# Patient Record
Sex: FEMALE | Race: WHITE | Employment: OTHER | URBAN - METROPOLITAN AREA
[De-identification: names, ages, dates, MRNs, and addresses within clinical notes are randomized per-mention and may not be internally consistent; named-entity substitution may affect disease eponyms.]

---

## 2018-12-24 ENCOUNTER — NURSING HOME VISIT (OUTPATIENT)
Dept: GERIATRICS | Facility: CLINIC | Age: 83
End: 2018-12-24
Payer: COMMERCIAL

## 2018-12-24 VITALS
SYSTOLIC BLOOD PRESSURE: 119 MMHG | HEART RATE: 65 BPM | OXYGEN SATURATION: 93 % | RESPIRATION RATE: 18 BRPM | BODY MASS INDEX: 24.11 KG/M2 | HEIGHT: 59 IN | WEIGHT: 119.6 LBS | TEMPERATURE: 97.6 F | DIASTOLIC BLOOD PRESSURE: 73 MMHG

## 2018-12-24 DIAGNOSIS — K58.9 IRRITABLE BOWEL SYNDROME, UNSPECIFIED TYPE: ICD-10-CM

## 2018-12-24 DIAGNOSIS — Z86.73 HISTORY OF CVA (CEREBROVASCULAR ACCIDENT): ICD-10-CM

## 2018-12-24 DIAGNOSIS — M25.512 CHRONIC LEFT SHOULDER PAIN: ICD-10-CM

## 2018-12-24 DIAGNOSIS — G20.A1 PARKINSON'S SYNDROME (H): ICD-10-CM

## 2018-12-24 DIAGNOSIS — F41.8 INSOMNIA SECONDARY TO DEPRESSION WITH ANXIETY: ICD-10-CM

## 2018-12-24 DIAGNOSIS — G89.29 CHRONIC LEFT SHOULDER PAIN: ICD-10-CM

## 2018-12-24 DIAGNOSIS — M15.0 PRIMARY OSTEOARTHRITIS INVOLVING MULTIPLE JOINTS: ICD-10-CM

## 2018-12-24 DIAGNOSIS — K59.01 SLOW TRANSIT CONSTIPATION: ICD-10-CM

## 2018-12-24 DIAGNOSIS — F51.05 INSOMNIA SECONDARY TO DEPRESSION WITH ANXIETY: ICD-10-CM

## 2018-12-24 DIAGNOSIS — F41.9 ANXIETY: ICD-10-CM

## 2018-12-24 DIAGNOSIS — H34.9 RETINAL VASCULAR OCCLUSION OF LEFT EYE: ICD-10-CM

## 2018-12-24 DIAGNOSIS — G31.84 MILD COGNITIVE IMPAIRMENT WITH MEMORY LOSS: ICD-10-CM

## 2018-12-24 PROBLEM — G25.81 RESTLESS LEGS SYNDROME: Status: ACTIVE | Noted: 2018-12-24

## 2018-12-24 PROBLEM — G47.00 INSOMNIA: Status: ACTIVE | Noted: 2018-12-24

## 2018-12-24 PROCEDURE — 99310 SBSQ NF CARE HIGH MDM 45: CPT | Performed by: NURSE PRACTITIONER

## 2018-12-24 RX ORDER — MIRTAZAPINE 15 MG/1
15 TABLET, FILM COATED ORAL AT BEDTIME
COMMUNITY
End: 2020-01-01

## 2018-12-24 RX ORDER — POLYETHYLENE GLYCOL 3350 17 G/17G
17 POWDER, FOR SOLUTION ORAL EVERY OTHER DAY
Start: 2018-12-24

## 2018-12-24 RX ORDER — MENTHOL AND METHYL SALICYLATE 10; 30 G/100G; G/100G
CREAM TOPICAL 2 TIMES DAILY
COMMUNITY
End: 2019-02-07

## 2018-12-24 RX ORDER — AMOXICILLIN 500 MG/1
2000 TABLET, FILM COATED ORAL PRN
COMMUNITY
End: 2019-05-23

## 2018-12-24 RX ORDER — ACETAMINOPHEN 325 MG/1
650 TABLET ORAL 3 TIMES DAILY
COMMUNITY
End: 2019-02-07

## 2018-12-24 RX ORDER — POLYETHYLENE GLYCOL 3350 17 G/17G
17 POWDER, FOR SOLUTION ORAL DAILY
COMMUNITY
End: 2018-12-24

## 2018-12-24 RX ORDER — CLONAZEPAM 0.25 MG/1
0.25 TABLET, ORALLY DISINTEGRATING ORAL AT BEDTIME
COMMUNITY
End: 2019-04-29

## 2018-12-24 RX ORDER — ERYTHROMYCIN 5 MG/G
0.25 OINTMENT OPHTHALMIC AT BEDTIME
COMMUNITY

## 2018-12-24 RX ORDER — CLOPIDOGREL BISULFATE 75 MG/1
75 TABLET ORAL DAILY
COMMUNITY

## 2018-12-24 ASSESSMENT — MIFFLIN-ST. JEOR: SCORE: 868.13

## 2018-12-24 NOTE — LETTER
"    12/24/2018        RE: Marilu Starr  [address] [facility] [address]        [place] [facility]    Chief Complaint   Patient presents with     Clinic Care Coordination - Initial       [facility] Medical Record Number:  [id number]  Place of Service where encounter took place:  [facility] ([facility]) [[id number]]    HPI:    Marilu Starr is a 89 year old  (8/9/1928), who is being seen today for an episodic care visit to establish care.  HPI information obtained from: facility chart records, facility staff, patient report, [facility] Epic chart review and family/first contact pt's sons' report report.Today's concern is:     Primary osteoarthritis involving multiple joints  Chronic left shoulder pain  Mild cognitive impairment with memory loss  Anxiety  Insomnia secondary to depression with anxiety  Irritable bowel syndrome, unspecified type  Slow transit constipation  History of CVA (cerebrovascular accident)  Parkinson's syndrome (H)  Retinal vascular occlusion of left eye     Patient is a 89 year old woman with PMH of anxiety, mild cognitive impairment, depression, insomnia, chronic OA pain especially  In her left shoulder, bilat knees, s/p left knee replacement, IBS now with constipation, Parkinson's features, and retinal vascular occlusion of left eye. She has been residing at [facility] and has not elected to have the [facility] team follow her there.      Today she is met in her LTC room where her two sons (Marcus and Darwin).  She reports left shoulder pain that is chronic but painful and some mild lightheadedness.  She denies SOB, CP, HA, heartburn, upset stomach, constipation.  Per [facility] EHR it appears she is having 2-3 BMs per day on average.   Her sons report that she has some cognitive impairment and have questions regarding this.  They also report that she has chronic left shoulder pain, had a \"rotator cuff repair in the past\" and have c/o bilat knee pain in the past (even after her left " "knee TKO which her son attributes to some possible \"phantom pain.\").      ALLERGIES: Codeine sulfate [codeine]; Demerol [meperidine]; and Morphine sulfate [morphine]  Past Medical, Surgical, Family and Social History reviewed and updated in [facility].    Current Outpatient Medications   Medication Sig Dispense Refill     acetaminophen (TYLENOL) 325 MG tablet Take 650 mg by mouth 3 times daily Also BID PRN       amoxicillin (AMOXIL) 500 MG tablet Take 2,000 mg by mouth as needed (prior to dental apt)       clonazePAM (KLONOPIN) 0.25 MG TBDP ODT tab Take 0.25 mg by mouth At Bedtime       clopidogrel (PLAVIX) 75 MG tablet Take 75 mg by mouth daily       erythromycin (ROMYCIN) 5 MG/GM ophthalmic ointment Place 0.25 inches into both eyes At Bedtime       Menthol-Methyl Salicylate (ICY HOT EXTRA STRENGTH) 10-30 % CREA Externally apply topically 2 times daily       mirtazapine (REMERON) 15 MG tablet Take 15 mg by mouth At Bedtime       polyethylene glycol (MIRALAX/GLYCOLAX) packet Take 17 g by mouth every other day       Medications reviewed:  Medications reconciled to facility chart and changes were made to reflect current medications as identified as above med list. Below are the changes that were made:   Medications stopped since last EPIC medication reconciliation:   There are no discontinued medications.    Medications started since last [facility] medication reconciliation:  Orders Placed This Encounter   Medications     acetaminophen (TYLENOL) 325 MG tablet     Sig: Take 650 mg by mouth 3 times daily Also BID PRN     amoxicillin (AMOXIL) 500 MG tablet     Sig: Take 2,000 mg by mouth as needed (prior to dental apt)     clonazePAM (KLONOPIN) 0.25 MG TBDP ODT tab     Sig: Take 0.25 mg by mouth At Bedtime     clopidogrel (PLAVIX) 75 MG tablet     Sig: Take 75 mg by mouth daily     erythromycin (ROMYCIN) 5 MG/GM ophthalmic ointment     Sig: Place 0.25 inches into both eyes At Bedtime     Menthol-Methyl Salicylate (ICY HOT EXTRA " "STRENGTH) 10-30 % CREA     Sig: Externally apply topically 2 times daily     mirtazapine (REMERON) 15 MG tablet     Sig: Take 15 mg by mouth At Bedtime     polyethylene glycol (MIRALAX/GLYCOLAX) packet     Sig: Take 17 g by mouth daily     REVIEW OF SYSTEMS:  Limited secondary to cognitive impairment but today pt reports 10 point ROS of systems including Constitutional, Eyes, Respiratory, Cardiovascular, Gastroenterology, Genitourinary, Integumentary, Musculoskeletal, Psychiatric were all negative except for pertinent positives noted in my HPI.    Physical Exam:  /73   Pulse 65   Temp 97.6  F (36.4  C)   Resp 18   Ht 1.499 m (4' 11\")   Wt 54.3 kg (119 lb 9.6 oz)   SpO2 93%   BMI 24.16 kg/m     GENERAL APPEARANCE:  Alert, in no distress, very frail, kyphotic posture, thin, pleasant  HEENT: [place] with bilat hearing aids in place  RESP:  respiratory effort and palpation of chest normal, auscultation of lungs clear but diminished , no respiratory distress  CV:  Palpation and auscultation of heart done , rate and rhythm regular, no murmur, no LE peripheral edema  ABDOMEN:  normal bowel sounds, soft, nontender, no hepatosplenomegaly or other masses  M/S:   Gait and station with tilting W/C for mobility, Digits and nails with arthritic changes, significantly reduced muscle mass, left knee old TKO scar. Left shoulder old scar  SKIN:  Inspection and Palpation of skin and subcutaneous tissue fragile, pale, poor turgor, intact (see above regarding scars)  PSYCH:  insight and judgement, memory with mild impairmwent, affect and mood normal, follows commands readily         Recent Labs:   Reviewed in SNF EHR      Assessment/Plan:  Primary osteoarthritis involving multiple joints  Chronic left shoulder pain  Per patient and family, bilat knee pain, left shoulder pain  S/p left TKO with persistent pain still after.     Patient today denies pain to her knees, reports left shoulder pain. Has scar which supports previous " "rotator cuff surgery; family reports \"repair\" not replacement.     Analgesia currently with Tylenol 650 mg TID and 650 mg BID PRN, BioFreeze topically. Patient reports today she finds relief from the Tylenol 650 mg.      Discission today about xray for left shoulder and if degenerative changes noted, can do steroid injection.   Risks vs benefits discussed with patient and her two sons who agreed to injection if xray confirmatory for OA changes.     PLAN:  - xray for left shoulder pain  - steroid injection soon if xray confirming degenerative changes  - continue Tylenol 650 mg TID and BID PRN  - continue BioFreeze.   - Physical Therapy, Occupational Therapy for therapy   - monitor for titration needs     Mild cognitive impairment with memory loss  BIMS 13 per SNF EHR  Patient conversant today, repeating her pain in left shoulder.   Discussion with family about dementia and progressive decline; large family history of dementia.   Can order cogn testing since ordering Physical Therapy, Occupational Therapy but unlikely would change POC for care/management.     PLAN:  - nursing for supportive cares  - Occupational Therapy for cog testing.     Anxiety  Insomnia secondary to depression with anxiety  On mirtazapine 15 mg at bedtime, clonazepam 0.25 mg at bedtime  PHQ9 - 0, per SNF EHR  Patient reports trouble falling asleep but is able to stay asleep.  She endorses a good appetite.   Weights stable around 119 lbs over last year.     PLAN:  - continue clonazepam and mirtazapine, monitor for ability to GDR.       Irritable bowel syndrome, unspecified type  Slow transit constipation  Family reports history of IBS with fluctuating constipation nd diarrhea.   Patient on Miralax 17 gm po q HS currently  Per EHR, patient having 2-3 BMs/day ob average.     PLAN:  - change Miralax to every other day  - monitor for titration needs     History of CVA (3/2005)  On clopidogrel 75 mg daily  Equal strengths with UE and LEs.   Seemingly " "no residual deficits   Continue copidogrel.      Parkinson's syndrome (H)  Family reports dx of PD but then Neurologist noted this is likely mis-diagnoses but she does have some Parkinsonism features. Taken off Sinemet by Neurologist    Family reports cognitive decline, \"freezing\" and stiffness.  Mild flat affect today, no drooling, no tremor noted.     Monitor     Retinal vascular occlusion of left eye  F/y Ophthalmologist Dr Rasehed  On erythromycin oint at bedtime  continue      Orders:  1. Physical Therapy, Occupational Therapy consult to eval & treat Dx: weakness, reduced ROM  2. Change Miralax to 17 gm po every other day  3. Left shoulder xray 2 view Dx: left shoulder pain.     Total time with patient visit: >40 minutes including discussions about the POC and care coordination with the patient, family, patient's sons and nursing. Greater than 50% of total time spent with counseling and coordinating care due to patient visit, review of records, discussion with patient's family regarding POC.      Electronically signed by  LYNDSAY Cotton CNP                      Sincerely,        LYNDSAY Cotton CNP    "

## 2018-12-27 ENCOUNTER — NURSING HOME VISIT (OUTPATIENT)
Dept: GERIATRICS | Facility: CLINIC | Age: 83
End: 2018-12-27
Payer: COMMERCIAL

## 2018-12-27 VITALS
DIASTOLIC BLOOD PRESSURE: 73 MMHG | HEART RATE: 65 BPM | HEIGHT: 59 IN | SYSTOLIC BLOOD PRESSURE: 119 MMHG | BODY MASS INDEX: 24.11 KG/M2 | WEIGHT: 119.6 LBS | RESPIRATION RATE: 18 BRPM | OXYGEN SATURATION: 93 % | TEMPERATURE: 97.6 F

## 2018-12-27 DIAGNOSIS — G31.84 MILD COGNITIVE IMPAIRMENT WITH MEMORY LOSS: ICD-10-CM

## 2018-12-27 DIAGNOSIS — M15.0 PRIMARY OSTEOARTHRITIS INVOLVING MULTIPLE JOINTS: Primary | ICD-10-CM

## 2018-12-27 DIAGNOSIS — G89.29 CHRONIC LEFT SHOULDER PAIN: ICD-10-CM

## 2018-12-27 DIAGNOSIS — M25.512 CHRONIC LEFT SHOULDER PAIN: ICD-10-CM

## 2018-12-27 PROCEDURE — 20610 DRAIN/INJ JOINT/BURSA W/O US: CPT | Mod: LT | Performed by: NURSE PRACTITIONER

## 2018-12-27 RX ORDER — METHYLPREDNISOLONE ACETATE 40 MG/ML
40 INJECTION, SUSPENSION INTRA-ARTICULAR; INTRALESIONAL; INTRAMUSCULAR; SOFT TISSUE ONCE
Status: DISCONTINUED | OUTPATIENT
Start: 2018-12-27 | End: 2019-07-05

## 2018-12-27 ASSESSMENT — MIFFLIN-ST. JEOR: SCORE: 868.13

## 2018-12-27 NOTE — LETTER
"    12/27/2018        RE: Marilu Starr  [address] [facility] [address]        [place] [facility]    Chief Complaint   Patient presents with     RECHECK       [facility] Medical Record Number:  [id number]  [id number]    HPI:    Marilu Starr is a 89 year old  (8/9/1928), who is being seen today for an episodic care visit.  HPI information obtained from: facility chart records, facility staff, patient report, [facility] Epic chart review and family/first contact Pt's sons'  report.       Today's concern is:     Primary osteoarthritis involving multiple joints  Chronic left shoulder pain  Mild cognitive impairment with memory loss     History of left shoulder pain s/p rotator cuff surgery (not shoulder replacement).  Had spoken to family at last visit about xray and joint injection for pain relief; patient's sons and patient in agreement if xrays showing degenerative changes.     12/25/18 Xray of left shoulder:  Mild degenerative changes in left shoulder without acute fracture or dislocation.     REVIEW OF SYSTEMS:  Limited secondary to cognitive impairment but today pt reports 4 point ROS including Respiratory, CV, GI and , other than that noted in the HPI,  is negative    /73   Pulse 65   Temp 97.6  F (36.4  C)   Resp 18   Ht 1.499 m (4' 11\")   Wt 54.3 kg (119 lb 9.6 oz)   SpO2 93%   BMI 24.16 kg/m     GENERAL APPEARANCE:  Alert, in no distress, frail  RESP:  respiratory effort normal, no respiratory distress  CV:  No LE peripheral edema  ABDOMEN:  nondistended  M/S:   Gait and station with kyphotic posture, stiffness, Digits and nails with arthritic changes, reduced muscle mass, left shoulder pain with restricted ROM  SKIN:  Inspection and Palpation of skin and subcutaneous tissue pale, , no rash appreciated  PSYCH:  insight and judgement, memory with mild impairment, affect and mood normal, follows commands readily "           ASSESSMENT/PLAN:     Primary osteoarthritis involving multiple joints  Chronic left shoulder pain  Mild cognitive impairment with memory loss     Risks vs benefits of injection discussed including but not limited to infection, contents of injection, expected results of injection.  Patient and her two sons elected to proceed.  Using sterile technique, the left shoulder was prepped with alcohol.  A 25 gauge needle was used to inject 40 mg DepoMedrol and 4 cc of 1% lidocaine into left shoulder, using posterior approach.  Slight aspiration before injection of steroids/lidocaine assured no blood vessel involvement. Patient tolerated the procedure well and without complications. No bleeding noted but band-aid applied.      Depo-Medrol 40 mg/ml  Lot: [id number]   Exp: 6/2019  1ml/40 mg     Lidocaine HCl 1% 10mg/ml  Lot: -[id number]  Exp: 10/1/19  4cc used       Electronically signed by:  LYNDSAY Cotton CNP      Sincerely,        LYNDSAY Cotton CNP

## 2018-12-27 NOTE — PROGRESS NOTES
"[place]    Chief Complaint   Patient presents with     RECHECK       [facility] Medical Record Number:  [id number]  [id number]    HPI:    Marilu Starr is a 89 year old  (8/9/1928), who is being seen today for an episodic care visit.  HPI information obtained from: facility chart records, facility staff, patient report, [facility] chart review and family/first contact Pt's sons'  report.       Today's concern is:     Primary osteoarthritis involving multiple joints  Chronic left shoulder pain  Mild cognitive impairment with memory loss     History of left shoulder pain s/p rotator cuff surgery (not shoulder replacement).  Had spoken to family at last visit about xray and joint injection for pain relief; patient's sons and patient in agreement if xrays showing degenerative changes.     12/25/18 Xray of left shoulder:  Mild degenerative changes in left shoulder without acute fracture or dislocation.     REVIEW OF SYSTEMS:  Limited secondary to cognitive impairment but today pt reports 4 point ROS including Respiratory, CV, GI and , other than that noted in the HPI,  is negative    /73   Pulse 65   Temp 97.6  F (36.4  C)   Resp 18   Ht 1.499 m (4' 11\")   Wt 54.3 kg (119 lb 9.6 oz)   SpO2 93%   BMI 24.16 kg/m    GENERAL APPEARANCE:  Alert, in no distress, frail  RESP:  respiratory effort normal, no respiratory distress  CV:  No LE peripheral edema  ABDOMEN:  nondistended  M/S:   Gait and station with kyphotic posture, stiffness, Digits and nails with arthritic changes, reduced muscle mass, left shoulder pain with restricted ROM  SKIN:  Inspection and Palpation of skin and subcutaneous tissue pale, , no rash appreciated  PSYCH:  insight and judgement, memory with mild impairment, affect and mood normal, follows commands readily           ASSESSMENT/PLAN:     Primary osteoarthritis involving multiple joints  Chronic left " shoulder pain  Mild cognitive impairment with memory loss     Risks vs benefits of injection discussed including but not limited to infection, contents of injection, expected results of injection.  Patient and her two sons elected to proceed.  Using sterile technique, the left shoulder was prepped with alcohol.  A 25 gauge needle was used to inject 40 mg DepoMedrol and 4 cc of 1% lidocaine into left shoulder, using posterior approach.  Slight aspiration before injection of steroids/lidocaine assured no blood vessel involvement. Patient tolerated the procedure well and without complications. No bleeding noted but band-aid applied.      Depo-Medrol 40 mg/ml  Lot: [id number]   Exp: 6/2019  1ml/40 mg     Lidocaine HCl 1% 10mg/ml  Lot: -[id number]  Exp: 10/1/19  4cc used       Electronically signed by:  LYNDSAY Cotton CNP

## 2019-01-15 VITALS
HEIGHT: 59 IN | WEIGHT: 117.4 LBS | RESPIRATION RATE: 18 BRPM | OXYGEN SATURATION: 95 % | DIASTOLIC BLOOD PRESSURE: 75 MMHG | SYSTOLIC BLOOD PRESSURE: 124 MMHG | BODY MASS INDEX: 23.67 KG/M2 | TEMPERATURE: 96.2 F | HEART RATE: 66 BPM

## 2019-01-15 ASSESSMENT — MIFFLIN-ST. JEOR: SCORE: 858.15

## 2019-01-15 NOTE — PROGRESS NOTES
The health plan new enrollment has happened. I have reviewed the  MDS, the preventative needs,  and facility care plan. The level of care is appropriate. I have reviewed the code status/advanced directives.

## 2019-01-16 ENCOUNTER — NURSING HOME VISIT (OUTPATIENT)
Dept: GERIATRICS | Facility: CLINIC | Age: 84
End: 2019-01-16
Payer: COMMERCIAL

## 2019-01-16 DIAGNOSIS — K59.01 SLOW TRANSIT CONSTIPATION: ICD-10-CM

## 2019-01-16 DIAGNOSIS — F41.8 DEPRESSION WITH ANXIETY: ICD-10-CM

## 2019-01-16 DIAGNOSIS — G89.29 CHRONIC LEFT SHOULDER PAIN: Primary | ICD-10-CM

## 2019-01-16 DIAGNOSIS — M25.512 CHRONIC LEFT SHOULDER PAIN: Primary | ICD-10-CM

## 2019-01-16 DIAGNOSIS — Z86.73 HISTORY OF CVA (CEREBROVASCULAR ACCIDENT): ICD-10-CM

## 2019-01-16 DIAGNOSIS — F03.90 DEMENTIA WITHOUT BEHAVIORAL DISTURBANCE, UNSPECIFIED DEMENTIA TYPE: ICD-10-CM

## 2019-01-16 PROCEDURE — 99310 SBSQ NF CARE HIGH MDM 45: CPT | Performed by: INTERNAL MEDICINE

## 2019-01-16 NOTE — LETTER
1/16/2019        RE: Marilu Starr  [facility]  [address] [facility] [address]        [place] [facility]  INITIAL VISIT NOTE  January 16, 2019    PRIMARY CARE PROVIDER AND CLINIC:  Thais Engel [address] [address]    Chief Complaint   Patient presents with     Establish Care       HPI:    Marilu Starr is a 89 year old  (8/9/1928) female who was seen at [facility] on January 16, 2019 for an initial visit. Medical history is notable for CVA, OA, L shoulder pain and dementia. She has resided at [facility] since September 2015 and recently transferred care to the [facility] in house medical team.     Today, Ms. Starr is seen in her room. History was limited due to her cognitive impairment. She did not recall the shoulder injection on 12/27/18 and wasn't sure if it helped. Didn't have L shoulder pain at the time I visited with her. No chest pain or dyspnea. No concerns per nursing.     CODE STATUS:   DNR / DNI    ALLERGIES:     Allergies   Allergen Reactions     Codeine Sulfate [Codeine]      Demerol [Meperidine]      Morphine Sulfate [Morphine]        PAST MEDICAL HISTORY:   CVA, OA, L shoulder pain and dementia    PAST SURGICAL HISTORY:   No past surgical history on file.    FAMILY HISTORY:   Unable to review due to cognitive impairment.     SOCIAL HISTORY:   Lives in SNF    MEDICATIONS:  Current Outpatient Medications   Medication Sig Dispense Refill     acetaminophen (TYLENOL) 325 MG tablet Take 650 mg by mouth 3 times daily Also BID PRN       amoxicillin (AMOXIL) 500 MG tablet Take 2,000 mg by mouth as needed (prior to dental apt)       clonazePAM (KLONOPIN) 0.25 MG TBDP ODT tab Take 0.25 mg by mouth At Bedtime       clopidogrel (PLAVIX) 75 MG tablet Take 75 mg by mouth daily       erythromycin (ROMYCIN) 5 MG/GM ophthalmic ointment Place 0.25 inches into both eyes At Bedtime       Menthol-Methyl Salicylate (ICY HOT EXTRA STRENGTH) 10-30 % CREA  "Externally apply topically 2 times daily       mirtazapine (REMERON) 15 MG tablet Take 15 mg by mouth At Bedtime       polyethylene glycol (MIRALAX/GLYCOLAX) packet Take 17 g by mouth every other day         Post Discharge Medication Reconciliation Status: medication reconcilation previously completed during another office visit.    The health plan new enrollment has happened. I have reviewed the  MDS, the preventative needs,  and facility care plan. The level of care is appropriate. I have reviewed the code status/advanced directives.       ROS:  Unable to obtain due to cognitive impairment or aphasia    PHYSICAL EXAM:  /75   Pulse 66   Temp 96.2  F (35.7  C)   Resp 18   Ht 1.499 m (4' 11\")   Wt 53.3 kg (117 lb 6.4 oz)   SpO2 95%   BMI 23.71 kg/m     Gen: sitting in high backed wheelchair, alert and in no acute distress  HEENT: normocephalic; oropharynx clear  Card: RRR, S1, S2, no murmurs  Resp: lungs clear to auscultation bilaterally  GI: abdomen soft, not-tender  MSK: normal muscle tone, no LE edema  Neuro: CX II-XII grossly in tact; ROM in all four extremities grossly in tact  Psych: memory, judgement and insight impaired    LABORATORY/IMAGING DATA:  Reviewed as per Epic    ASSESSMENT/PLAN:    Chronic L Shoulder Pain   Had a corticosteroid injection on 12/27, but doesn't recall this and isn't sure if it helped. No shoulder pain this morning  -- continues on APAP 650 mg TID and BID PRN and Icy Hot BID     Dementia Without Behavioral Disturbance  Oriented to self.   -- ongoing 24/7 nursing and supportive cares    Depression / Anxiety  Mood and spirits seemed OK today  -- continues on clonazepam 0.25 mg at bedtime and mirtazapine 15 mg at bedtime  -- supportive cares    CVA  By history in 2005  -- continues on clopidogrel 75 mg daily    Slow Transit Constipation  -- continues on Miralax 17g every other day  -- adjust bowel regimen as needed      Electronically signed by:  Gena Nicholas, " MD                        Sincerely,        Gena Nicholas MD

## 2019-01-16 NOTE — PROGRESS NOTES
[place] [facility]  INITIAL VISIT NOTE  January 16, 2019    PRIMARY CARE PROVIDER AND CLINIC:  Thais Engel [address] [address] / [facility] [address]    Chief Complaint   Patient presents with     Establish Care       HPI:    Marilu Starr is a 89 year old  (8/9/1928) female who was seen at [facility] on January 16, 2019 for an initial visit. Medical history is notable for CVA, OA, L shoulder pain and dementia. She has resided at [facility] since September 2015 and recently transferred care to the [facility] in house medical team.     Today, Ms. Starr is seen in her room. History was limited due to her cognitive impairment. She did not recall the shoulder injection on 12/27/18 and wasn't sure if it helped. Didn't have L shoulder pain at the time I visited with her. No chest pain or dyspnea. No concerns per nursing.     CODE STATUS:   DNR / DNI    ALLERGIES:     Allergies   Allergen Reactions     Codeine Sulfate [Codeine]      Demerol [Meperidine]      Morphine Sulfate [Morphine]        PAST MEDICAL HISTORY:   CVA, OA, L shoulder pain and dementia    PAST SURGICAL HISTORY:   No past surgical history on file.    FAMILY HISTORY:   Unable to review due to cognitive impairment.     SOCIAL HISTORY:   Lives in SNF    MEDICATIONS:  Current Outpatient Medications   Medication Sig Dispense Refill     acetaminophen (TYLENOL) 325 MG tablet Take 650 mg by mouth 3 times daily Also BID PRN       amoxicillin (AMOXIL) 500 MG tablet Take 2,000 mg by mouth as needed (prior to dental apt)       clonazePAM (KLONOPIN) 0.25 MG TBDP ODT tab Take 0.25 mg by mouth At Bedtime       clopidogrel (PLAVIX) 75 MG tablet Take 75 mg by mouth daily       erythromycin (ROMYCIN) 5 MG/GM ophthalmic ointment Place 0.25 inches into both eyes At Bedtime       Menthol-Methyl Salicylate (ICY HOT EXTRA STRENGTH) 10-30 % CREA Externally apply topically 2 times daily       mirtazapine (REMERON) 15 MG tablet Take 15 mg by mouth At  "Bedtime       polyethylene glycol (MIRALAX/GLYCOLAX) packet Take 17 g by mouth every other day         Post Discharge Medication Reconciliation Status: medication reconcilation previously completed during another office visit.    The health plan new enrollment has happened. I have reviewed the  MDS, the preventative needs,  and facility care plan. The level of care is appropriate. I have reviewed the code status/advanced directives.       ROS:  Unable to obtain due to cognitive impairment or aphasia    PHYSICAL EXAM:  /75   Pulse 66   Temp 96.2  F (35.7  C)   Resp 18   Ht 1.499 m (4' 11\")   Wt 53.3 kg (117 lb 6.4 oz)   SpO2 95%   BMI 23.71 kg/m    Gen: sitting in high backed wheelchair, alert and in no acute distress  HEENT: normocephalic; oropharynx clear  Card: RRR, S1, S2, no murmurs  Resp: lungs clear to auscultation bilaterally  GI: abdomen soft, not-tender  MSK: normal muscle tone, no LE edema  Neuro: CX II-XII grossly in tact; ROM in all four extremities grossly in tact  Psych: memory, judgement and insight impaired    LABORATORY/IMAGING DATA:  Reviewed as per Epic    ASSESSMENT/PLAN:    Chronic L Shoulder Pain   Had a corticosteroid injection on 12/27, but doesn't recall this and isn't sure if it helped. No shoulder pain this morning  -- continues on APAP 650 mg TID and BID PRN and Icy Hot BID     Dementia Without Behavioral Disturbance  Oriented to self.   -- ongoing 24/7 nursing and supportive cares    Depression / Anxiety  Mood and spirits seemed OK today  -- continues on clonazepam 0.25 mg at bedtime and mirtazapine 15 mg at bedtime  -- supportive cares    CVA  By history in 2005  -- continues on clopidogrel 75 mg daily    Slow Transit Constipation  -- continues on Miralax 17g every other day  -- adjust bowel regimen as needed      Electronically signed by:  Gena Nicholas MD                    "

## 2019-02-06 NOTE — PROGRESS NOTES
"  [place] [facility]    Chief Complaint   Patient presents with     retirement Regulatory       [place] Medical Record Number:  [id number]  Place of Service where encounter took place[facility] [[id number]    HPI:    Marilu Starr is a 89 year old  (8/9/1928), who is being seen today for a federally mandated E/M visit.  HPI information obtained from: facility chart records, facility staff, patient report and [facility] chart review. Today's concerns are:  Parkinson's syndrome (H)  Taken off Sinemet by her Neurologist  Family reports cognitive decline, \"freezing\" and stiffness  Patient today noted to have stiffness and little movement.    She has increased saliva but thus far is managing it (no drooling) however does have a cough. LS clear today (dim bases)    Dementia without behavioral disturbance, unspecified dementia type  Depression with anxiety  Anxiety  On clonazepam 0.25 mg q HS, mirtazapine 15 mg q HS    BIMS 11/15  PHQ9 - 1/27    Family visits often and is supportive. Patient likes to participate in activities and it is reported she gets anxious after the activity and she feels she is being left behind.  Reassurance offered by staff.      Patient reports she has an \"ok\" appetite but has trouble falling asleep.      History of CVA (cerebrovascular accident)  On Plavix 75 mg daily  Difficult to say if deficits in place as patient significantly deconditioned.     Primary osteoarthritis involving multiple joints  Chronic left shoulder pain  Per patient/family report: bilat knee pain, left shoulder pain  Gave patient steroid injection 12/27/18 in left shoulder for degenerative changes noted on imagining - patient does not remember having this done.   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        ALLERGIES: Codeine sulfate [codeine]; Demerol [meperidine]; and " Morphine sulfate [morphine]  PAST MEDICAL HISTORY:  has no past medical history on file.  PAST SURGICAL HISTORY:  has no past surgical history on file.  FAMILY HISTORY: family history is not on file.  SOCIAL HISTORY:      MEDICATIONS:  Current Outpatient Medications   Medication Sig Dispense Refill     acetaminophen (TYLENOL) 325 MG tablet Take 650 mg by mouth 3 times daily Also BID PRN       amoxicillin (AMOXIL) 500 MG tablet Take 2,000 mg by mouth as needed (prior to dental apt)       clonazePAM (KLONOPIN) 0.25 MG TBDP ODT tab Take 0.25 mg by mouth At Bedtime       clopidogrel (PLAVIX) 75 MG tablet Take 75 mg by mouth daily       erythromycin (ROMYCIN) 5 MG/GM ophthalmic ointment Place 0.25 inches into both eyes At Bedtime       Menthol-Methyl Salicylate (ICY HOT EXTRA STRENGTH) 10-30 % CREA Externally apply topically 2 times daily       mirtazapine (REMERON) 15 MG tablet Take 15 mg by mouth At Bedtime       polyethylene glycol (MIRALAX/GLYCOLAX) packet Take 17 g by mouth every other day       Medications reviewed:  Medications reconciled to facility chart and changes were made to reflect current medications as identified as above med list. Below are the changes that were made:   Medications stopped since last EPIC medication reconciliation:   There are no discontinued medications.    Medications started since last [facility] medication reconciliation:  No orders of the defined types were placed in this encounter.    Case Management:  I have reviewed the care plan and MDS and do agree with the plan. Patient's desire to return to the community is not present.  Information reviewed:  Medications, vital signs, orders, and nursing notes.    ROS:  Limited secondary to cognitive impairment but today pt report 10 point ROS of systems including Constitutional, Eyes, Respiratory, Cardiovascular, Gastroenterology, Genitourinary, Integumentary, Musculoskeletal, Psychiatric were all negative except for pertinent positives noted in  "my HPI.    Exam:  Vitals: /79   Pulse 82   Temp 97.8  F (36.6  C)   Resp 18   Ht 1.499 m (4' 11\")   Wt 54 kg (119 lb)   SpO2 95%   BMI 24.04 kg/m    BMI= Body mass index is 24.04 kg/m .  GENERAL APPEARANCE:  Alert, in no distress, deconditioned, frail, small frame  RESP:  respiratory effort and palpation of chest normal, auscultation of lungs clear with dim bases, congested cough, no respiratory distress  CV:  Palpation and auscultation of heart done , rate and rhythm regular, no murmur, scant LE peripheral edema  ABDOMEN:  normal bowel sounds, soft, nontender, no hepatosplenomegaly or other masses  M/S:   Gait and station with W/C for mobility, significant kyphotic posture notes, Digits and nails with significant arthritic changes, reduced muscle mass  SKIN:  Inspection and Palpation of skin and subcutaneous tissue pale, fragile, poor turgor,   PSYCH:  insight and judgement, memory with impairment, affect flat and mood normal, is able to follow commands readily but her body prohibits her sometimes.     Lab/Diagnostic data:   Reviewed in SNF    ASSESSMENT/PLAN  Parkinson's syndrome (H)  Progressive in nature  Will need to monitor for aspiration (and therefore possible pneumonia) - can consult SLP if needed  Nursing for supportive cares    Dementia without behavioral disturbance, unspecified dementia type  Depression with anxiety  Anxiety  Nursing for supportive cares  Patient reports she has trouble falling asleep.  She is anxious to some degree at our visit, worried about her body, pain, etc. No GDR of clonazepam or mirtazapine at this time as patient needs appetite stimulation, mental health support, and assistance with sleep.     History of CVA (cerebrovascular accident)  Stable.   Patient remains on Plavix  Low falls risk    Primary osteoarthritis involving multiple joints  Chronic left shoulder pain  Majority of our visit spent on pain management strategies.   Can increase Tylenol to 975 mg TID and " decrease PRN to daily, increase Icy Hot to TID  Would prefer to avoid narcotics in this frail, small woman. Will monitor for effectiveness.       Orders:  1. Increase scheduled Tylenol to 975 mg TID  2. Decrease PRN Tylenol to 650 mg daily PRN  3. Increase Icy Hot to TID     The health plan new enrollment has happened. I have reviewed the  MDS, the preventative needs,  and facility care plan. The level of care is appropriate. I have reviewed the code status/advanced directives.         Electronically signed by:  LYNDSAY Cotton CNP

## 2019-02-07 ENCOUNTER — NURSING HOME VISIT (OUTPATIENT)
Dept: GERIATRICS | Facility: CLINIC | Age: 84
End: 2019-02-07
Payer: COMMERCIAL

## 2019-02-07 VITALS
SYSTOLIC BLOOD PRESSURE: 138 MMHG | BODY MASS INDEX: 23.99 KG/M2 | HEART RATE: 82 BPM | OXYGEN SATURATION: 95 % | TEMPERATURE: 97.8 F | WEIGHT: 119 LBS | RESPIRATION RATE: 18 BRPM | DIASTOLIC BLOOD PRESSURE: 79 MMHG | HEIGHT: 59 IN

## 2019-02-07 DIAGNOSIS — M15.0 PRIMARY OSTEOARTHRITIS INVOLVING MULTIPLE JOINTS: ICD-10-CM

## 2019-02-07 DIAGNOSIS — F41.8 DEPRESSION WITH ANXIETY: ICD-10-CM

## 2019-02-07 DIAGNOSIS — F03.90 DEMENTIA WITHOUT BEHAVIORAL DISTURBANCE, UNSPECIFIED DEMENTIA TYPE: ICD-10-CM

## 2019-02-07 DIAGNOSIS — G89.29 CHRONIC LEFT SHOULDER PAIN: ICD-10-CM

## 2019-02-07 DIAGNOSIS — M25.512 CHRONIC LEFT SHOULDER PAIN: ICD-10-CM

## 2019-02-07 DIAGNOSIS — F41.9 ANXIETY: ICD-10-CM

## 2019-02-07 DIAGNOSIS — Z86.73 HISTORY OF CVA (CEREBROVASCULAR ACCIDENT): ICD-10-CM

## 2019-02-07 DIAGNOSIS — G20.A1 PARKINSON'S SYNDROME (H): Primary | ICD-10-CM

## 2019-02-07 PROCEDURE — 99309 SBSQ NF CARE MODERATE MDM 30: CPT | Performed by: NURSE PRACTITIONER

## 2019-02-07 RX ORDER — MENTHOL AND METHYL SALICYLATE 10; 30 G/100G; G/100G
CREAM TOPICAL 3 TIMES DAILY
Start: 2019-02-07

## 2019-02-07 RX ORDER — ACETAMINOPHEN 325 MG/1
975 TABLET ORAL 3 TIMES DAILY
Start: 2019-02-07 | End: 2021-01-01

## 2019-02-07 ASSESSMENT — MIFFLIN-ST. JEOR: SCORE: 865.41

## 2019-02-07 NOTE — LETTER
"    2/7/2019        RE: Marilu Starr  [facility]  [address] [address]          [place] [facility]    Chief Complaint   Patient presents with     custodial Regulatory       [place] Medical Record Number:  [id number]  Place of Service where encounter took place:  [facility] [[id number]]    HPI:    Marilu Starr is a 89 year old  (8/9/1928), who is being seen today for a federally mandated E/M visit.  HPI information obtained from: facility chart records, facility staff, patient report and [facility] chart review. Today's concerns are:  Parkinson's syndrome (H)  Taken off Sinemet by her Neurologist  Family reports cognitive decline, \"freezing\" and stiffness  Patient today noted to have stiffness and little movement.    She has increased saliva but thus far is managing it (no drooling) however does have a cough. LS clear today (dim bases)    Dementia without behavioral disturbance, unspecified dementia type  Depression with anxiety  Anxiety  On clonazepam 0.25 mg q HS, mirtazapine 15 mg q HS    BIMS 11/15  PHQ9 - 1/27    Family visits often and is supportive. Patient likes to participate in activities and it is reported she gets anxious after the activity and she feels she is being left behind.  Reassurance offered by staff.      Patient reports she has an \"ok\" appetite but has trouble falling asleep.      History of CVA (cerebrovascular accident)  On Plavix 75 mg daily  Difficult to say if deficits in place as patient significantly deconditioned.     Primary osteoarthritis involving multiple joints  Chronic left shoulder pain  Per patient/family report: bilat knee pain, left shoulder pain  Gave patient steroid injection 12/27/18 in left shoulder for degenerative changes noted on imagining - patient does not remember having this done.   S/p left TKA with persistent pain after     Analgesia Tylenol 650 mg TID and 650 mg BID PRN, Icy Hot BID    Patient reports left shoulder " and posterior neck pain.        ALLERGIES: Codeine sulfate [codeine]; Demerol [meperidine]; and Morphine sulfate [morphine]  PAST MEDICAL HISTORY:  has no past medical history on file.  PAST SURGICAL HISTORY:  has no past surgical history on file.  FAMILY HISTORY: family history is not on file.  SOCIAL HISTORY:      MEDICATIONS:  Current Outpatient Medications   Medication Sig Dispense Refill     acetaminophen (TYLENOL) 325 MG tablet Take 650 mg by mouth 3 times daily Also BID PRN       amoxicillin (AMOXIL) 500 MG tablet Take 2,000 mg by mouth as needed (prior to dental apt)       clonazePAM (KLONOPIN) 0.25 MG TBDP ODT tab Take 0.25 mg by mouth At Bedtime       clopidogrel (PLAVIX) 75 MG tablet Take 75 mg by mouth daily       erythromycin (ROMYCIN) 5 MG/GM ophthalmic ointment Place 0.25 inches into both eyes At Bedtime       Menthol-Methyl Salicylate (ICY HOT EXTRA STRENGTH) 10-30 % CREA Externally apply topically 2 times daily       mirtazapine (REMERON) 15 MG tablet Take 15 mg by mouth At Bedtime       polyethylene glycol (MIRALAX/GLYCOLAX) packet Take 17 g by mouth every other day       Medications reviewed:  Medications reconciled to facility chart and changes were made to reflect current medications as identified as above med list. Below are the changes that were made:   Medications stopped since last EPIC medication reconciliation:   There are no discontinued medications.    Medications started since last [facility] medication reconciliation:  No orders of the defined types were placed in this encounter.    Case Management:  I have reviewed the care plan and MDS and do agree with the plan. Patient's desire to return to the community is not present.  Information reviewed:  Medications, vital signs, orders, and nursing notes.    ROS:  Limited secondary to cognitive impairment but today pt report 10 point ROS of systems including Constitutional, Eyes, Respiratory, Cardiovascular, Gastroenterology, Genitourinary,  "Integumentary, Musculoskeletal, Psychiatric were all negative except for pertinent positives noted in my HPI.    Exam:  Vitals: /79   Pulse 82   Temp 97.8  F (36.6  C)   Resp 18   Ht 1.499 m (4' 11\")   Wt 54 kg (119 lb)   SpO2 95%   BMI 24.04 kg/m     BMI= Body mass index is 24.04 kg/m .  GENERAL APPEARANCE:  Alert, in no distress, deconditioned, frail, small frame  RESP:  respiratory effort and palpation of chest normal, auscultation of lungs clear with dim bases, congested cough, no respiratory distress  CV:  Palpation and auscultation of heart done , rate and rhythm regular, no murmur, scant LE peripheral edema  ABDOMEN:  normal bowel sounds, soft, nontender, no hepatosplenomegaly or other masses  M/S:   Gait and station with W/C for mobility, significant kyphotic posture notes, Digits and nails with significant arthritic changes, reduced muscle mass  SKIN:  Inspection and Palpation of skin and subcutaneous tissue pale, fragile, poor turgor,   PSYCH:  insight and judgement, memory with impairment, affect flat and mood normal, is able to follow commands readily but her body prohibits her sometimes.     Lab/Diagnostic data:   Reviewed in SNF    ASSESSMENT/PLAN  Parkinson's syndrome (H)  Progressive in nature  Will need to monitor for aspiration (and therefore possible pneumonia) - can consult SLP if needed  Nursing for supportive cares    Dementia without behavioral disturbance, unspecified dementia type  Depression with anxiety  Anxiety  Nursing for supportive cares  Patient reports she has trouble falling asleep.  She is anxious to some degree at our visit, worried about her body, pain, etc. No GDR of clonazepam or mirtazapine at this time as patient needs appetite stimulation, mental health support, and assistance with sleep.     History of CVA (cerebrovascular accident)  Stable.   Patient remains on Plavix  Low falls risk    Primary osteoarthritis involving multiple joints  Chronic left shoulder " pain  Majority of our visit spent on pain management strategies.   Can increase Tylenol to 975 mg TID and decrease PRN to daily, increase Icy Hot to TID  Would prefer to avoid narcotics in this frail, small woman. Will monitor for effectiveness.       Orders:  1. Increase scheduled Tylenol to 975 mg TID  2. Decrease PRN Tylenol to 650 mg daily PRN  3. Increase Icy Hot to TID       Electronically signed by:  LYNDSAY Cotton CNP        Sincerely,        LYNDSAY Cotton CNP

## 2019-03-19 VITALS
HEIGHT: 59 IN | HEART RATE: 71 BPM | OXYGEN SATURATION: 95 % | RESPIRATION RATE: 18 BRPM | SYSTOLIC BLOOD PRESSURE: 137 MMHG | WEIGHT: 118.4 LBS | TEMPERATURE: 97.7 F | BODY MASS INDEX: 23.87 KG/M2 | DIASTOLIC BLOOD PRESSURE: 78 MMHG

## 2019-03-19 ASSESSMENT — MIFFLIN-ST. JEOR: SCORE: 862.69

## 2019-03-20 ENCOUNTER — NURSING HOME VISIT (OUTPATIENT)
Dept: GERIATRICS | Facility: CLINIC | Age: 84
End: 2019-03-20
Payer: COMMERCIAL

## 2019-03-20 DIAGNOSIS — M19.90 ARTHRITIS PAIN: ICD-10-CM

## 2019-03-20 DIAGNOSIS — G20.A1 PARKINSON'S SYNDROME (H): Primary | ICD-10-CM

## 2019-03-20 DIAGNOSIS — Z86.73 HISTORY OF CVA (CEREBROVASCULAR ACCIDENT): ICD-10-CM

## 2019-03-20 PROCEDURE — 99309 SBSQ NF CARE MODERATE MDM 30: CPT | Performed by: INTERNAL MEDICINE

## 2019-03-20 NOTE — LETTER
"    3/20/2019        RE: Marilu Starr  [facility]  [address] [address]          [place] [facility]  PHYSICIAN NOTE    Chief Complaint   Patient presents with     alf Regulatory     [place] Medical Record Number:  [id number]  Place of Service where encounter took place:  [facility] ([facility]) [[id number]]      HPI:    Marilu Starr  is 89 year old (8/9/1928), who is being seen today for a federally mandated E/M visit.  HPI information obtained from: facility chart records, facility staff, patient report and [facility] chart review.    Today's concerns are:  She recently had a tooth/crown break and was seen today by the in-house dentist. Not feasible to fix especially since she does not have pain with it so they are going to monitor it. She perseverates on this but repeatedly thankfully denies pain. Says she feels she can eat ok. Denies swallowing problems though per chart review there is concern for aspiration in the future; has increased saliva. She has h/o Parkinson's disease vs Parkinsonism and h/o cognitive impairment. She has lived at [facility] since 2015.  She has h/o arthritis and left shoulder pain; Tylenol increased last month. Today she denies pain.  She says her mood is \"ok\" though does have h/o anxiety and depression.   Sons Darwin and Marcus are involved in her care.    ALLERGIES:Codeine sulfate [codeine]; Demerol [meperidine]; and Morphine sulfate [morphine]       MEDICATIONS:  Current Outpatient Medications   Medication Sig Dispense Refill     acetaminophen (TYLENOL) 325 MG tablet Take 3 tablets (975 mg) by mouth 3 times daily And 650 mg daily PRN       amoxicillin (AMOXIL) 500 MG tablet Take 2,000 mg by mouth as needed (prior to dental apt)       clonazePAM (KLONOPIN) 0.25 MG TBDP ODT tab Take 0.25 mg by mouth At Bedtime       clopidogrel (PLAVIX) 75 MG tablet Take 75 mg by mouth daily       erythromycin (ROMYCIN) 5 MG/GM ophthalmic ointment Place 0.25 " "inches into both eyes At Bedtime       Menthol-Methyl Salicylate (ICY HOT EXTRA STRENGTH) 10-30 % CREA Externally apply topically 3 times daily       mirtazapine (REMERON) 15 MG tablet Take 15 mg by mouth At Bedtime       polyethylene glycol (MIRALAX/GLYCOLAX) packet Take 17 g by mouth every other day         Case Management:  I have reviewed the care plan and MDS and do agree with the plan. Patient's desire to return to the community is not assessible due to cognitive impairment. Information reviewed:  Medications, vital signs, orders, and nursing notes.    ROS:  Limited secondary to cognitive impairment but today pt reports as noted above in HPI    Vitals:  /78   Pulse 71   Temp 97.7  F (36.5  C)   Resp 18   Ht 1.499 m (4' 11\")   Wt 53.7 kg (118 lb 6.4 oz)   SpO2 95%   BMI 23.91 kg/m     Body mass index is 23.91 kg/m .  Exam:  Sitting up in wheelchair, well groomed  Soft spoken  No resting tremor noted but +bilateral upper extremity cogwheel rigidity   HRRR without mrg, no edema  Lungs clear without wcr  Abdomen soft  Perseverates on her recent dental appointment with a hint of anxiety    Lab/Diagnostic data:   No recent labs    ASSESSMENT/PLAN  Parkinson's syndrome (H)  Per notes she was taken off of Sinemet by neurologist  Family has also reported cognitive decline  BIMS 11/15  She is in wheelchair today and is quite frail  Is in supportive LTC environment now since 2015    History of CVA (cerebrovascular accident)  On Plavix; yearly CBC appropriate    Arthritis pain  On Tylenol and denies pain today    Anxiety  On Clonazepam 0.25 mg q HS, mirtazapine 15 mg q HS  Consider future trial of further Clonazepam dose reduction      Electronically signed by:  Rajwinder Harvey DO          Sincerely,        Rajwinder Harvey, DO    "

## 2019-03-21 NOTE — PROGRESS NOTES
"  [place] [facility]  PHYSICIAN NOTE    Chief Complaint   Patient presents with     [facility] Regulatory     [place] Medical Record Number:  [id number]  Place of Service where encounter took place:  [facility] ([facility]) [[id number]]      HPI:    Marilu Starr  is 89 year old (8/9/1928), who is being seen today for a federally mandated E/M visit.  HPI information obtained from: facility chart records, facility staff, patient report and [place] [facility] chart review.    Today's concerns are:  She recently had a tooth/crown break and was seen today by the in-house dentist. Not feasible to fix especially since she does not have pain with it so they are going to monitor it. She perseverates on this but repeatedly thankfully denies pain. Says she feels she can eat ok. Denies swallowing problems though per chart review there is concern for aspiration in the future; has increased saliva. She has h/o Parkinson's disease vs Parkinsonism and h/o cognitive impairment. She has lived at [facility] since 2015.  She has h/o arthritis and left shoulder pain; Tylenol increased last month. Today she denies pain.  She says her mood is \"ok\" though does have h/o anxiety and depression.   Sons Darwin and Marcus are involved in her care.    ALLERGIES:Codeine sulfate [codeine]; Demerol [meperidine]; and Morphine sulfate [morphine]       MEDICATIONS:  Current Outpatient Medications   Medication Sig Dispense Refill     acetaminophen (TYLENOL) 325 MG tablet Take 3 tablets (975 mg) by mouth 3 times daily And 650 mg daily PRN       amoxicillin (AMOXIL) 500 MG tablet Take 2,000 mg by mouth as needed (prior to dental apt)       clonazePAM (KLONOPIN) 0.25 MG TBDP ODT tab Take 0.25 mg by mouth At Bedtime       clopidogrel (PLAVIX) 75 MG tablet Take 75 mg by mouth daily       erythromycin (ROMYCIN) 5 MG/GM ophthalmic ointment Place 0.25 inches into both eyes At Bedtime       Menthol-Methyl Salicylate (ICY HOT EXTRA STRENGTH) 10-30 % CREA " "Externally apply topically 3 times daily       mirtazapine (REMERON) 15 MG tablet Take 15 mg by mouth At Bedtime       polyethylene glycol (MIRALAX/GLYCOLAX) packet Take 17 g by mouth every other day         Case Management:  I have reviewed the care plan and MDS and do agree with the plan. Patient's desire to return to the community is not assessible due to cognitive impairment. Information reviewed:  Medications, vital signs, orders, and nursing notes.    ROS:  Limited secondary to cognitive impairment but today pt reports as noted above in HPI    Vitals:  /78   Pulse 71   Temp 97.7  F (36.5  C)   Resp 18   Ht 1.499 m (4' 11\")   Wt 53.7 kg (118 lb 6.4 oz)   SpO2 95%   BMI 23.91 kg/m    Body mass index is 23.91 kg/m .  Exam:  Sitting up in wheelchair, well groomed  Soft spoken  No resting tremor noted but +bilateral upper extremity cogwheel rigidity   HRRR without mrg, no edema  Lungs clear without wcr  Abdomen soft  Perseverates on her recent dental appointment with a hint of anxiety    Lab/Diagnostic data:   No recent labs    ASSESSMENT/PLAN  Parkinson's syndrome (H)  Per notes she was taken off of Sinemet by neurologist  Family has also reported cognitive decline  BIMS 11/15  She is in wheelchair today and is quite frail  Is in supportive LTC environment now since 2015    History of CVA (cerebrovascular accident)  On Plavix; yearly CBC appropriate    Arthritis pain  On Tylenol and denies pain today    Anxiety  On Clonazepam 0.25 mg q HS, mirtazapine 15 mg q HS  Consider future trial of further Clonazepam dose reduction      Electronically signed by:  Rajwinder Harvey DO      "

## 2019-03-23 PROBLEM — M19.90 ARTHRITIS PAIN: Status: ACTIVE | Noted: 2019-03-23

## 2019-04-29 DIAGNOSIS — F41.8 DEPRESSION WITH ANXIETY: Primary | ICD-10-CM

## 2019-04-29 RX ORDER — CLONAZEPAM 0.25 MG/1
0.25 TABLET, ORALLY DISINTEGRATING ORAL
Qty: 12 TABLET | Refills: 0 | Status: SHIPPED | OUTPATIENT
Start: 2019-04-29 | End: 2019-05-23

## 2019-05-22 VITALS
TEMPERATURE: 97.5 F | HEART RATE: 62 BPM | WEIGHT: 119 LBS | OXYGEN SATURATION: 96 % | RESPIRATION RATE: 18 BRPM | DIASTOLIC BLOOD PRESSURE: 90 MMHG | BODY MASS INDEX: 23.99 KG/M2 | HEIGHT: 59 IN | SYSTOLIC BLOOD PRESSURE: 116 MMHG

## 2019-05-22 ASSESSMENT — MIFFLIN-ST. JEOR: SCORE: 865.41

## 2019-05-22 NOTE — PROGRESS NOTES
[place] [facility]  Chief Complaint   Patient presents with     Annual Comprehensive Nursing Home     [place] Medical Record Number:  [id number]  Place of Service where encounter took place:  [facility] ([facility]) [[id number]]    HPI:    Marilu Starr  is a 89 year old  (8/9/1928), who is being seen today for an annual comprehensive visit. HPI information obtained from: facility chart records, facility staff, patient report, [facility] chart review and family/first contact pt's daughter's report.  Today's concerns are:  Parkinson's syndrome (H)  Taken off Sinemet by Neurologist  Moved to LT SNF 2015  Uses W/C for mobility  Family has noted cognitive decline (see below for scoring)    Patient's daughter denies any issues with swallowing/coughing while eating.     History of CVA (cerebrovascular accident) - 3/2005  On Plavix 75 mg daily for ppx  Last CBC 2/1/19 - Hgb 13.2, Plt 291  Cognitive decline noted    Dementia without behavioral disturbance, unspecified dementia type  Depression with anxiety  Insomnia secondary to depression with anxiety  SLUMS 12/22/15 - 7/30  BIMS 7/15  PHQ9 - 3  On mirtazapine 15 mg q HS  Clonazepam GDR'd successfully recently. Patient reports she has some trouble sleeping but this is chronic and not worse since GDR of clonazepam. Patient's daughter reports patient takes various naps throughout the day which may be contributory.    Weights stable ~119.5 lbs  Resides in SNF for increased care needs  Able to make her needs known but unlikely she asks for much as she is quite content and demands quite little.     Primary osteoarthritis involving multiple joints  Chronic left shoulder pain  Bilateral chronic knee pain  S/p left TKO with persistent pain  Analgesia with Tylenol 975 mg TID and 650 mg daily PRN, IcyHot to shoulder and neck TID  Has had steroid injections in the past; unknown if it has helped.     Patient reports no pain initially, then when asked specifically she reports  left shoulder pain that is tolerable; denies knee pain today.     Irritable bowel syndrome, unspecified type  Slow transit constipation  On Miralax 17 gm every other day  EHR showing roughly 2 BMs/day  Patient denies constipation (may be poor historian)    Retinal vascular occlusion of left eye  F/b Ophthalmology, Dr Rasheed  On erythromycin ointment q HS      ALLERGIES: Codeine sulfate [codeine]; Demerol [meperidine]; and Morphine sulfate [morphine]  PAST MEDICAL HISTORY:  has no past medical history on file.  PAST SURGICAL HISTORY:  has no past surgical history on file.  IMMUNIZATIONS:    There is no immunization history on file for this patient.  Above immunizations pulled from [employer]. [facility] and facility records also reconciled. Outstanding information sent to  to update [employer].  Future immunizations are not needed at this point as all recommended immunizations are up to date.     Current Outpatient Medications   Medication Sig Dispense Refill     acetaminophen (TYLENOL) 325 MG tablet Take 3 tablets (975 mg) by mouth 3 times daily And 650 mg daily PRN       clopidogrel (PLAVIX) 75 MG tablet Take 75 mg by mouth daily       erythromycin (ROMYCIN) 5 MG/GM ophthalmic ointment Place 0.25 inches into both eyes At Bedtime       Menthol-Methyl Salicylate (ICY HOT EXTRA STRENGTH) 10-30 % CREA Externally apply topically 3 times daily       mirtazapine (REMERON) 15 MG tablet Take 15 mg by mouth At Bedtime       polyethylene glycol (MIRALAX/GLYCOLAX) packet Take 17 g by mouth every other day         Case Management:  I have reviewed the facility/SNF care plan/MDS, including the falls risk, nutrition and pain screening. I also reviewed the current immunizations, and preventive care. .Future cancer screening is not clinically indicated secondary to age/goals of care Patient's desire to return to the community is not present. Current Level of Care is appropriate.    Advance Directive Discussion:    I  "reviewed the current advanced directives as reflected in EPIC, the POLST and the facility chart, and verified the congruency of orders.    Team Discussion:  I communicated with the appropriate disciplines involved with the Plan of Care:   Nursing    Patient's goal is pain control and comfort.  Information reviewed:  Medications, vital signs, orders, and nursing notes.    ROS:  Limited secondary to cognitive impairment but today pt reports 10 point ROS of systems including Constitutional, Eyes, Respiratory, Cardiovascular, Gastroenterology, Genitourinary, Integumentary, Musculoskeletal, Psychiatric were all negative except for pertinent positives noted in my HPI.    Vitals:  /90   Pulse 62   Temp 97.5  F (36.4  C)   Resp 18   Ht 1.499 m (4' 11\")   Wt 54 kg (119 lb)   SpO2 96%   BMI 24.04 kg/m   Body mass index is 24.04 kg/m .  Exam:  GENERAL APPEARANCE:  Alert, in no distress, very frail, thin/small frame  ENT:  Mouth and posterior oropharynx normal, moist mucous membranes, hearing acuity [place]  EYES:  EOM, conjunctivae, lids, pupils and irises normal  NECK:  No adenopathy,masses or thyromegaly  RESP:  respiratory effort and palpation of chest normal, no respiratory distress, Lung sounds clear but quite diminished  CV:  Palpation and auscultation of heart done , rate and rhythm regular, no murmur, no rub or gallop, Edema none  ABDOMEN:  normal bowel sounds, soft, nontender, no hepatosplenomegaly or other masses  M/S:   Gait and station with W/C for mobility, has significant kyphotic curvature and contractured MSK framework. , Digits and nails with arthritic changes  SKIN:  Inspection/Palpation of skin and subcutaneous tissue intact but pale, poor turgor  NEURO: 2-12 in normal limits and at patient's baseline  PSYCH:  insight and judgement, memory with impairment, affect flat and mood normal    Lab/Diagnostic data:   Labs done while at Skilled Nursing Facility done by [facility] previously, " now changed to [place] [facility]. Pertinent results are: reviewed today - see SNF EHR.     ASSESSMENT/PLAN  Parkinson's syndrome (H)  Drooling noted upon visit today along with flat affect and minimal movement.    Patient has extensive kyphotic curvature and more contractured musculature.    She utilizes W/C for mobility and family denies any issues with swallowing.  Will need to monitor closely for need for SLP consult.   Stable at this time.     History of CVA (cerebrovascular accident)  Noted decline of cognition and functionality  Has nursing for supportive cares   Remains on Plavix - can send for yearly CBC for review     Dementia without behavioral disturbance, unspecified dementia type  Depression with anxiety  Insomnia secondary to depression with anxiety  Successful GDR of clonazepam but still with persistent sleep concerns which sound chronic and may be related to sleep hygiene more than anything.  No GDR of mirtazapine at this time as also being used for appetite stimulant.    Continue nursing for supportive cares in SNF LTC.   Monitor for needs     Primary osteoarthritis involving multiple joints  Chronic left shoulder pain  Bilateral chronic knee pain  Noted to patient's daughter and patient that steroid injections could be repeated; patient's daughter noted she would tell her brother who is in charge of these things.    Currently analgesia seems optimal.  Discussion about tapering down of Tylenol but patient and family leery at this time as pain persists   Will monitor.     Irritable bowel syndrome, unspecified type  Slow transit constipation  Stable with current regimen  Monitor for titration needs    Retinal vascular occlusion of left eye  Return to ophthalmologist as planned.  Continue regimen as ordered.       Orders written by provider at facility  1. BMP, TSH, CBC recheck. Dx: dementia, fatigue, insomnia, PD.     Electronically signed by:  LYNDSAY Cotton CNP

## 2019-05-23 ENCOUNTER — NURSING HOME VISIT (OUTPATIENT)
Dept: GERIATRICS | Facility: CLINIC | Age: 84
End: 2019-05-23
Payer: COMMERCIAL

## 2019-05-23 DIAGNOSIS — Z86.73 HISTORY OF CVA (CEREBROVASCULAR ACCIDENT): ICD-10-CM

## 2019-05-23 DIAGNOSIS — K59.01 SLOW TRANSIT CONSTIPATION: ICD-10-CM

## 2019-05-23 DIAGNOSIS — F41.8 INSOMNIA SECONDARY TO DEPRESSION WITH ANXIETY: ICD-10-CM

## 2019-05-23 DIAGNOSIS — G20.A1 PARKINSON'S SYNDROME (H): Primary | ICD-10-CM

## 2019-05-23 DIAGNOSIS — K58.9 IRRITABLE BOWEL SYNDROME, UNSPECIFIED TYPE: ICD-10-CM

## 2019-05-23 DIAGNOSIS — M15.0 PRIMARY OSTEOARTHRITIS INVOLVING MULTIPLE JOINTS: ICD-10-CM

## 2019-05-23 DIAGNOSIS — G89.29 BILATERAL CHRONIC KNEE PAIN: ICD-10-CM

## 2019-05-23 DIAGNOSIS — H34.9 RETINAL VASCULAR OCCLUSION OF LEFT EYE: ICD-10-CM

## 2019-05-23 DIAGNOSIS — G89.29 CHRONIC LEFT SHOULDER PAIN: ICD-10-CM

## 2019-05-23 DIAGNOSIS — F03.90 DEMENTIA WITHOUT BEHAVIORAL DISTURBANCE, UNSPECIFIED DEMENTIA TYPE: ICD-10-CM

## 2019-05-23 DIAGNOSIS — F41.8 DEPRESSION WITH ANXIETY: ICD-10-CM

## 2019-05-23 DIAGNOSIS — M25.561 BILATERAL CHRONIC KNEE PAIN: ICD-10-CM

## 2019-05-23 DIAGNOSIS — F51.05 INSOMNIA SECONDARY TO DEPRESSION WITH ANXIETY: ICD-10-CM

## 2019-05-23 DIAGNOSIS — M25.562 BILATERAL CHRONIC KNEE PAIN: ICD-10-CM

## 2019-05-23 DIAGNOSIS — M25.512 CHRONIC LEFT SHOULDER PAIN: ICD-10-CM

## 2019-05-23 PROCEDURE — 99318 ZZC ANNUAL NURSING FAC ASSESSMNT, STABLE: CPT | Performed by: NURSE PRACTITIONER

## 2019-05-23 NOTE — LETTER
5/23/2019        RE: Marilu Starr  [facility]  [address] [address]        [place] [facility]  Chief Complaint   Patient presents with     Annual Comprehensive Nursing Home     [place] Medical Record Number:  [id number]  Place of Service where encounter took place:  [facility] ([facility]) [[id number]]    HPI:    Marilu Starr  is a 89 year old  (8/9/1928), who is being seen today for an annual comprehensive visit. HPI information obtained from: facility chart records, facility staff, patient report, [facility] chart review and family/first contact pt's daughter's report.  Today's concerns are:  Parkinson's syndrome (H)  Taken off Sinemet by Neurologist  Moved to LT SNF 2015  Uses W/C for mobility  Family has noted cognitive decline (see below for scoring)    Patient's daughter denies any issues with swallowing/coughing while eating.     History of CVA (cerebrovascular accident) - 3/2005  On Plavix 75 mg daily for ppx  Last CBC 2/1/19 - Hgb 13.2, Plt 291  Cognitive decline noted    Dementia without behavioral disturbance, unspecified dementia type  Depression with anxiety  Insomnia secondary to depression with anxiety  SLUMS 12/22/15 - 7/30  BIMS 7/15  PHQ9 - 3  On mirtazapine 15 mg q HS  Clonazepam GDR'd successfully recently. Patient reports she has some trouble sleeping but this is chronic and not worse since GDR of clonazepam. Patient's daughter reports patient takes various naps throughout the day which may be contributory.    Weights stable ~119.5 lbs  Resides in SNF for increased care needs  Able to make her needs known but unlikely she asks for much as she is quite content and demands quite little.     Primary osteoarthritis involving multiple joints  Chronic left shoulder pain  Bilateral chronic knee pain  S/p left TKO with persistent pain  Analgesia with Tylenol 975 mg TID and 650 mg daily PRN, IcyHot to shoulder and neck TID  Has had steroid injections in the past;  unknown if it has helped.     Patient reports no pain initially, then when asked specifically she reports left shoulder pain that is tolerable; denies knee pain today.     Irritable bowel syndrome, unspecified type  Slow transit constipation  On Miralax 17 gm every other day  EHR showing roughly 2 BMs/day  Patient denies constipation (may be poor historian)    Retinal vascular occlusion of left eye  F/b Ophthalmology, Dr Rasheed  On erythromycin ointment q HS      ALLERGIES: Codeine sulfate [codeine]; Demerol [meperidine]; and Morphine sulfate [morphine]  PAST MEDICAL HISTORY:  has no past medical history on file.  PAST SURGICAL HISTORY:  has no past surgical history on file.  IMMUNIZATIONS:    There is no immunization history on file for this patient.  Above immunizations pulled from [facility]. [facility] and facility records also reconciled. Outstanding information sent to  to update [facility].  Future immunizations are not needed at this point as all recommended immunizations are up to date.     Current Outpatient Medications   Medication Sig Dispense Refill     acetaminophen (TYLENOL) 325 MG tablet Take 3 tablets (975 mg) by mouth 3 times daily And 650 mg daily PRN       clopidogrel (PLAVIX) 75 MG tablet Take 75 mg by mouth daily       erythromycin (ROMYCIN) 5 MG/GM ophthalmic ointment Place 0.25 inches into both eyes At Bedtime       Menthol-Methyl Salicylate (ICY HOT EXTRA STRENGTH) 10-30 % CREA Externally apply topically 3 times daily       mirtazapine (REMERON) 15 MG tablet Take 15 mg by mouth At Bedtime       polyethylene glycol (MIRALAX/GLYCOLAX) packet Take 17 g by mouth every other day         Case Management:  I have reviewed the facility/SNF care plan/MDS, including the falls risk, nutrition and pain screening. I also reviewed the current immunizations, and preventive care. .Future cancer screening is not clinically indicated secondary to age/goals of care Patient's desire to return to  "the community is not present. Current Level of Care is appropriate.    Advance Directive Discussion:    I reviewed the current advanced directives as reflected in EPIC, the POLST and the facility chart, and verified the congruency of orders.    Team Discussion:  I communicated with the appropriate disciplines involved with the Plan of Care:   Nursing    Patient's goal is pain control and comfort.  Information reviewed:  Medications, vital signs, orders, and nursing notes.    ROS:  Limited secondary to cognitive impairment but today pt reports 10 point ROS of systems including Constitutional, Eyes, Respiratory, Cardiovascular, Gastroenterology, Genitourinary, Integumentary, Musculoskeletal, Psychiatric were all negative except for pertinent positives noted in my HPI.    Vitals:  /90   Pulse 62   Temp 97.5  F (36.4  C)   Resp 18   Ht 1.499 m (4' 11\")   Wt 54 kg (119 lb)   SpO2 96%   BMI 24.04 kg/m    Body mass index is 24.04 kg/m .  Exam:  GENERAL APPEARANCE:  Alert, in no distress, very frail, thin/small frame  ENT:  Mouth and posterior oropharynx normal, moist mucous membranes, hearing acuity [place]  EYES:  EOM, conjunctivae, lids, pupils and irises normal  NECK:  No adenopathy,masses or thyromegaly  RESP:  respiratory effort and palpation of chest normal, no respiratory distress, Lung sounds clear but quite diminished  CV:  Palpation and auscultation of heart done , rate and rhythm regular, no murmur, no rub or gallop, Edema none  ABDOMEN:  normal bowel sounds, soft, nontender, no hepatosplenomegaly or other masses  M/S:   Gait and station with W/C for mobility, has significant kyphotic curvature and contractured MSK framework. , Digits and nails with arthritic changes  SKIN:  Inspection/Palpation of skin and subcutaneous tissue intact but pale, poor turgor  NEURO: 2-12 in normal limits and at patient's baseline  PSYCH:  insight and judgement, memory with impairment, affect flat and mood " normal    Lab/Diagnostic data:   Labs done [facility] at Sk[facility] previously, now changed to [place] lab. Pertinent results are: reviewed today - see SNF EHR.     ASSESSMENT/PLAN  Parkinson's syndrome (H)  Drooling noted upon visit today along with flat affect and minimal movement.    Patient has extensive kyphotic curvature and more contractured musculature.    She utilizes W/C for mobility and family denies any issues with swallowing.  Will need to monitor closely for need for SLP consult.   Stable at this time.     History of CVA (cerebrovascular accident)  Noted decline of cognition and functionality  Has nursing for supportive cares   Remains on Plavix - can send for yearly CBC for review     Dementia without behavioral disturbance, unspecified dementia type  Depression with anxiety  Insomnia secondary to depression with anxiety  Successful GDR of clonazepam but still with persistent sleep concerns which sound chronic and may be related to sleep hygiene more than anything.  No GDR of mirtazapine at this time as also being used for appetite stimulant.    Continue nursing for supportive cares in SNF LTC.   Monitor for needs     Primary osteoarthritis involving multiple joints  Chronic left shoulder pain  Bilateral chronic knee pain  Noted to patient's daughter and patient that steroid injections could be repeated; patient's daughter noted she would tell her brother who is in charge of these things.    Currently analgesia seems optimal.  Discussion about tapering down of Tylenol but patient and family leery at this time as pain persists   Will monitor.     Irritable bowel syndrome, unspecified type  Slow transit constipation  Stable with current regimen  Monitor for titration needs    Retinal vascular occlusion of left eye  Return to ophthalmologist as planned.  Continue regimen as ordered.       Orders written by provider at facility  1. BMP, TSH, CBC recheck. Dx:  dementia, fatigue, insomnia, PD.     Electronically signed by:  LYNDSAY Cotton CNP             Sincerely,        LYNDSAY Cotton CNP

## 2019-07-03 NOTE — PROGRESS NOTES
"[place] [facility]  PHYSICIAN NOTE    Chief Complaint   Patient presents with     skilled nursing Regulatory       HPI:    Marilu Starr is a 89 year old  (8/9/1928), who is being seen today for a federally mandated E/M visit at [facility] . Admitted to LT in 2015. Since my last visit with her in March, she had successful GDR of Clonazepam 0.25 mg. Does have frailty, OA, dementia, Parkinsonism and sleep hygiene impairment. Basic labs in May showed unremarkable TSH, CBC and BMP with slightly low vitamin D level at 14. Her weight is stable.    Marilu was enjoying being in the common TV room with other residents when I visited with her. She had no complaints. Says that anxiety is \"ok\". Admits to \"chronic\" insomnia but doesn't seem to tell the difference in this since Clonazepam has been off of her regimen. She doesn't remember visiting with me a few months ago nor PCP Thais. Nursing staff have no concerns regarding Marilu.     ALLERGIES: Codeine sulfate [codeine]; Demerol [meperidine]; and Morphine sulfate [morphine]    Past Medical History:   Diagnosis Date     CVD (cerebrovascular disease)      Insomnia      Parkinsonism (H)      Restless legs syndrome (RLS)         CODE STATUS: DNR/I    MEDICATIONS:  Current Outpatient Medications   Medication Sig Dispense Refill     acetaminophen (TYLENOL) 325 MG tablet Take 3 tablets (975 mg) by mouth 3 times daily And 650 mg daily PRN       clopidogrel (PLAVIX) 75 MG tablet Take 75 mg by mouth daily       erythromycin (ROMYCIN) 5 MG/GM ophthalmic ointment Place 0.25 inches into both eyes At Bedtime       Menthol-Methyl Salicylate (ICY HOT EXTRA STRENGTH) 10-30 % CREA Externally apply topically 3 times daily       mirtazapine (REMERON) 15 MG tablet Take 15 mg by mouth At Bedtime       polyethylene glycol (MIRALAX/GLYCOLAX) packet Take 17 g by mouth every other day         ROS:  Limited secondary to cognitive impairment but today pt reports as per above in " "HPI    Exam:  /72   Pulse 63   Temp 97.5  F (36.4  C)   Resp 20   Ht 1.499 m (4' 11\")   Wt 54 kg (119 lb)   SpO2 95%   BMI 24.04 kg/m     Recent vitals also reviewed in [facility] and reflective of above  Sitting in power wheelchair in NAD  Kyphotic posture, masked facies, hypophonic/slowed speech  No tremor noted  HRRR without mrg  Breathing non-labored  Wearing shin protectors  Affect blunted    Lab/Diagnostic Data:    Labs from 5/30/19 as follows:  Vit D 14  TSH 1.5  WBC 8, Hgb 12.7, Plt 256  BMP within normal limits with creatinine 0.57    ASSESSMENT/PLAN:  Parkinsonism vs Parkinson's disease  Per notes she was taken off of Sinemet by neurologist some time ago  She is quite frail and kyphotic with masked facies and hypophonic speech  Not drooling today but has been in the past  No known aspiration events    Cerebrovascular accident (CVA), unspecified mechanism  Is on chronic Plavix; recent CBC within normal limits     Primary insomnia  Remains on Remeron and has h/o RLS too may be r/t her Parkinsonism  Unchanged per her report without Clonazepam so glad this medication was able to be removed from her regimen especially in the setting of her dementia    Vitamin D deficiency  Slightly low vitamin D - will defer to PCP on supplementation      Total time spent with patient visit was >15 min including patient visit and review of past records. Greater than 50% of total time spent with counseling and coordinating care.    Electronically signed by:  Rajwinder Harvey DO    "

## 2019-07-05 ENCOUNTER — NURSING HOME VISIT (OUTPATIENT)
Dept: GERIATRICS | Facility: CLINIC | Age: 84
End: 2019-07-05
Payer: COMMERCIAL

## 2019-07-05 VITALS
SYSTOLIC BLOOD PRESSURE: 131 MMHG | HEART RATE: 63 BPM | BODY MASS INDEX: 23.99 KG/M2 | OXYGEN SATURATION: 95 % | DIASTOLIC BLOOD PRESSURE: 72 MMHG | RESPIRATION RATE: 20 BRPM | WEIGHT: 119 LBS | HEIGHT: 59 IN | TEMPERATURE: 97.5 F

## 2019-07-05 DIAGNOSIS — F51.01 PRIMARY INSOMNIA: ICD-10-CM

## 2019-07-05 DIAGNOSIS — I63.9 CEREBROVASCULAR ACCIDENT (CVA), UNSPECIFIED MECHANISM (H): ICD-10-CM

## 2019-07-05 DIAGNOSIS — E55.9 VITAMIN D DEFICIENCY: ICD-10-CM

## 2019-07-05 DIAGNOSIS — G20.A1 PARKINSON'S SYNDROME (H): Primary | ICD-10-CM

## 2019-07-05 PROCEDURE — 99308 SBSQ NF CARE LOW MDM 20: CPT | Performed by: INTERNAL MEDICINE

## 2019-07-05 ASSESSMENT — MIFFLIN-ST. JEOR: SCORE: 865.41

## 2019-07-05 NOTE — LETTER
"    7/5/2019        RE: Marilu Starr  [facility]  [address] [address]        [place] [facility]  PHYSICIAN NOTE    Chief Complaint   Patient presents with     care home Regulatory       HPI:    Marilu Starr is a 89 year old  (8/9/1928), who is being seen today for a federally mandated E/M visit at [facility] . Admitted to LT in 2015. Since my last visit with her in March, she had successful GDR of Clonazepam 0.25 mg. Does have frailty, OA, dementia, Parkinsonism and sleep hygiene impairment. Basic labs in May showed unremarkable TSH, CBC and BMP with slightly low vitamin D level at 14. Her weight is stable.    Marilu was enjoying being in the common TV room with other residents when I visited with her. She had no complaints. Says that anxiety is \"ok\". Admits to \"chronic\" insomnia but doesn't seem to tell the difference in this since Clonazepam has been off of her regimen. She doesn't remember visiting with me a few months ago nor PCP Thais. Nursing staff have no concerns regarding Marilu.     ALLERGIES: Codeine sulfate [codeine]; Demerol [meperidine]; and Morphine sulfate [morphine]    Past Medical History:   Diagnosis Date     CVD (cerebrovascular disease)      Insomnia      Parkinsonism (H)      Restless legs syndrome (RLS)         CODE STATUS: DNR/I    MEDICATIONS:  Current Outpatient Medications   Medication Sig Dispense Refill     acetaminophen (TYLENOL) 325 MG tablet Take 3 tablets (975 mg) by mouth 3 times daily And 650 mg daily PRN       clopidogrel (PLAVIX) 75 MG tablet Take 75 mg by mouth daily       erythromycin (ROMYCIN) 5 MG/GM ophthalmic ointment Place 0.25 inches into both eyes At Bedtime       Menthol-Methyl Salicylate (ICY HOT EXTRA STRENGTH) 10-30 % CREA Externally apply topically 3 times daily       mirtazapine (REMERON) 15 MG tablet Take 15 mg by mouth At Bedtime       polyethylene glycol (MIRALAX/GLYCOLAX) packet Take 17 g by mouth every other day  " "       ROS:  Limited secondary to cognitive impairment but today pt reports as per above in HPI    Exam:  /72   Pulse 63   Temp 97.5  F (36.4  C)   Resp 20   Ht 1.499 m (4' 11\")   Wt 54 kg (119 lb)   SpO2 95%   BMI 24.04 kg/m      Recent vitals also reviewed in [facility] and reflective of above  Sitting in power wheelchair in NAD  Kyphotic posture, masked facies, hypophonic/slowed speech  No tremor noted  HRRR without mrg  Breathing non-labored  Wearing shin protectors  Affect blunted    Lab/Diagnostic Data:    Labs from 5/30/19 as follows:  Vit D 14  TSH 1.5  WBC 8, Hgb 12.7, Plt 256  BMP within normal limits with creatinine 0.57    ASSESSMENT/PLAN:  Parkinsonism vs Parkinson's disease  Per notes she was taken off of Sinemet by neurologist some time ago  She is quite frail and kyphotic with masked facies and hypophonic speech  Not drooling today but has been in the past  No known aspiration events    Cerebrovascular accident (CVA), unspecified mechanism  Is on chronic Plavix; recent CBC within normal limits     Primary insomnia  Remains on Remeron and has h/o RLS too may be r/t her Parkinsonism  Unchanged per her report without Clonazepam so glad this medication was able to be removed from her regimen especially in the setting of her dementia    Vitamin D deficiency  Slightly low vitamin D - will defer to PCP on supplementation      Total time spent with patient visit was >15 min including patient visit and review of past records. Greater than 50% of total time spent with counseling and coordinating care.    Electronically signed by:  Rajwinder Harvey DO        Sincerely,        Rajwinder Harvey DO    "

## 2019-07-07 PROBLEM — E55.9 VITAMIN D DEFICIENCY: Status: ACTIVE | Noted: 2019-07-07

## 2019-08-01 ENCOUNTER — AMBULATORY - HEALTHEAST (OUTPATIENT)
Dept: OTHER | Facility: CLINIC | Age: 84
End: 2019-08-01

## 2019-08-01 ENCOUNTER — DOCUMENTATION ONLY (OUTPATIENT)
Dept: OTHER | Facility: CLINIC | Age: 84
End: 2019-08-01

## 2019-08-27 NOTE — PROGRESS NOTES
"[facility]  [place] Medical Record Number:  [id number]  Place of Service where encounter took place:  [facility] ([facility]) [[id number]]  Chief Complaint   Patient presents with     RECHECK     HPI: Marilu Starr  is a 89 year old (8/9/1928), who is being seen today for an episodic care visit.  HPI information obtained from: facility chart records, facility staff, patient report, [facility] chart review and Care Everywhere [facility] chart review.     Today's concern is:  Muscle pain  Acquired postural kyphosis  Parkinson's syndrome (H)  Cerebrovascular accident (CVA), unspecified mechanism (H)  Nursing notified provider today s/p care conference where patient and family expressed that left should pain was still problematic. Noting historical joint injections here, which patient has previously stated that she wasn't sure if it helped. Family/patient are wondering about alternatives. Chart review shows Tylenol scheduled TID, and topical muscle rub on schedule. Today, patient states her neck hurts, she denies headache, dizziness, SOB, joint swelling. She is unable to mobilize independently but can perform passive ROM. VSS.    Past Medical and Surgical History reviewed in [facility] today.  REVIEW OF SYSTEMS: Limited secondary to cognitive impairment but today pt reports the above and 4 point ROS including Respiratory, CV, GI and , other than that noted in the HPI,  is negative.    Objective:  BP (!) 140/76   Pulse 72   Temp 97.9  F (36.6  C)   Resp 20   Ht 1.499 m (4' 11\")   Wt 54.2 kg (119 lb 8 oz)   SpO2 95%   BMI 24.14 kg/m    Exam:  GENERAL APPEARANCE: Alert, in no distress, cooperative.   ENT: Mouth/posterior oropharynx intact w/ moist mucous membranes, hearing acuity [place].  EYES: EOM, conjunctivae, lids, pupils and irises normal, PERRL2.   RESP: Respiratory effort good, no respiratory distress, Lung sounds clear/diminished. On RA.  ABDOMEN: Normal bowel sounds, soft, non-tender abdomen, and no " masses palpated.  SKIN: Inspection/Palpation of skin and subcutaneous tissue baseline w/ fragility. No wounds/rashes noted.   MSK: ROM to left shoulder severely impaired and contractures present in bicep/tricep, and forearms. Severe cervical kyphosis and surrounding muscle tension.   NEURO: CN II-XII at patient's baseline, sensation baseline PPS.  PSYCH: Insight, judgement, and memory are impaired at baseline, affect and mood are anxious.    Labs:   Recent labs in [facility] reviewed by me today.     ASSESSMENT/PLAN:  Muscle pain  Acquired postural kyphosis  Parkinson's syndrome (H)  Cerebrovascular accident (CVA), unspecified mechanism (H)  Chronic. Stable. Today, patient expressed more pain from muscle tension than arthritic in nature. Will order PT/OT for eval and treat, would recommend that nursing alternative heat/ice, and if possible, consult massage therapy. Also consulted recreational therapy to engage patient in any activities to reduce pain and increase comfort. Follow-up routinely or as needed.    Orders written by provider at facility and transcribed by : Maritza Kelley.  1. PT/OT eval - DX - shoulder pain / kyphosis tension  2. Massage Therapy x 1 - Dx - shoulder pain  / kyphosis tension    Electronically signed by:   Dr. Estefany Humphries, APRN CNP DNP

## 2019-08-28 ENCOUNTER — NURSING HOME VISIT (OUTPATIENT)
Dept: GERIATRICS | Facility: CLINIC | Age: 84
End: 2019-08-28
Payer: COMMERCIAL

## 2019-08-28 VITALS
WEIGHT: 119.5 LBS | RESPIRATION RATE: 20 BRPM | OXYGEN SATURATION: 95 % | TEMPERATURE: 97.9 F | BODY MASS INDEX: 24.09 KG/M2 | SYSTOLIC BLOOD PRESSURE: 140 MMHG | DIASTOLIC BLOOD PRESSURE: 76 MMHG | HEIGHT: 59 IN | HEART RATE: 72 BPM

## 2019-08-28 DIAGNOSIS — M40.00 ACQUIRED POSTURAL KYPHOSIS: ICD-10-CM

## 2019-08-28 DIAGNOSIS — G20.A1 PARKINSON'S SYNDROME (H): ICD-10-CM

## 2019-08-28 DIAGNOSIS — M79.10 MUSCLE PAIN: Primary | ICD-10-CM

## 2019-08-28 DIAGNOSIS — I63.9 CEREBROVASCULAR ACCIDENT (CVA), UNSPECIFIED MECHANISM (H): ICD-10-CM

## 2019-08-28 PROCEDURE — 99309 SBSQ NF CARE MODERATE MDM 30: CPT | Performed by: NURSE PRACTITIONER

## 2019-08-28 ASSESSMENT — MIFFLIN-ST. JEOR: SCORE: 862.68

## 2019-08-28 NOTE — LETTER
"    8/28/2019        RE: Marilu Starr  [facility]  [address] [address]        [place] [facility]  [place] Medical Record Number:  [id number]  Place of Service where encounter took place:  [facility] ([facility]) [[id number]]  Chief Complaint   Patient presents with     RECHECK     HPI: Marilu Starr  is a 89 year old (8/9/1928), who is being seen today for an episodic care visit.  HPI information obtained from: facility chart records, facility staff, patient report, [facility] chart review and Care Everywhere [facility] chart review.     Today's concern is:  Muscle pain  Acquired postural kyphosis  Parkinson's syndrome (H)  Cerebrovascular accident (CVA), unspecified mechanism (H)  Nursing notified provider today s/p care conference where patient and family expressed that left should pain was still problematic. Noting historical joint injections here, which patient has previously stated that she wasn't sure if it helped. Family/patient are wondering about alternatives. Chart review shows Tylenol scheduled TID, and topical muscle rub on schedule. Today, patient states her neck hurts, she denies headache, dizziness, SOB, joint swelling. She is unable to mobilize independently but can perform passive ROM. VSS.    Past Medical and Surgical History reviewed in [facility] today.  REVIEW OF SYSTEMS: Limited secondary to cognitive impairment but today pt reports the above and 4 point ROS including Respiratory, CV, GI and , other than that noted in the HPI,  is negative.    Objective:  BP (!) 140/76   Pulse 72   Temp 97.9  F (36.6  C)   Resp 20   Ht 1.499 m (4' 11\")   Wt 54.2 kg (119 lb 8 oz)   SpO2 95%   BMI 24.14 kg/m     Exam:  GENERAL APPEARANCE: Alert, in no distress, cooperative.   ENT: Mouth/posterior oropharynx intact w/ moist mucous membranes, hearing acuity [place].  EYES: EOM, conjunctivae, lids, pupils and irises normal, PERRL2.   RESP: Respiratory effort good, no respiratory distress, " Lung sounds clear/diminished. On RA.  ABDOMEN: Normal bowel sounds, soft, non-tender abdomen, and no masses palpated.  SKIN: Inspection/Palpation of skin and subcutaneous tissue baseline w/ fragility. No wounds/rashes noted.   MSK: ROM to left shoulder severely impaired and contractures present in bicep/tricep, and forearms. Severe cervical kyphosis and surrounding muscle tension.   NEURO: CN II-XII at patient's baseline, sensation baseline PPS.  PSYCH: Insight, judgement, and memory are impaired at baseline, affect and mood are anxious.    Labs:   Recent labs in [facility] reviewed by me today.     ASSESSMENT/PLAN:  Muscle pain  Acquired postural kyphosis  Parkinson's syndrome (H)  Cerebrovascular accident (CVA), unspecified mechanism (H)  Chronic. Stable. Today, patient expressed more pain from muscle tension than arthritic in nature. Will order PT/OT for eval and treat, would recommend that nursing alternative heat/ice, and if possible, consult massage therapy. Also consulted recreational therapy to engage patient in any activities to reduce pain and increase comfort. Follow-up routinely or as needed.    Orders written by provider at facility and transcribed by : Maritza Kelley.  1. PT/OT eval - DX - shoulder pain / kyphosis tension  2. Massage Therapy x 1 - Dx - shoulder pain  / kyphosis tension    Electronically signed by:   LYNDSAY Robledo CNP DNP        Sincerely,        LYNDSAY Ellsworth CNP

## 2019-09-03 NOTE — PROGRESS NOTES
[place] [facility]  Chief Complaint   Patient presents with     half-way Regulatory     [place] Medical Record Number:  [id number]  Place of Service where encounter took place[facility] [[id number][facility]    HPI:    Marilu Starr  is 89 year old (8/9/1928), who is being seen today for a federally mandated E/M visit.  HPI information obtained from: facility chart records, facility staff, patient report and [facility] chart review. Today's concerns are:  Dementia without behavioral disturbance, unspecified dementia type  Depression with anxiety  Insomnia secondary to depression with anxiety  Patient successfully weaned off clonazepam and reports sleeping well.  She is also on mirtazapine 15 mg q HS  PHQ9 - 0  BIMS 10  No behaviors per nursing report.     Parkinsonism, unspecified Parkinsonism type (H)  History of being taken off Sinemet by her Neurologist  W/C bound with masked facies   On regular diet with weights stable around 119.5 lbs.   Not has had drooling in the past, none during today's visit.      Muscle pain  Acquired postural kyphosis  Chronic left shoulder pain  Significant issue with pain management  Patient currently on Tylenol 650 mg daily PRN, 975 mg TID, Icy Hot topically TID  8/28/19 NP visit with Physical Therapy and Massage Therapy ordered  Notes indicate [facility].   Notes also indicate heat working well for patient.   She has had steroid injections in the past with questionable success.    Patient today reporting left shoulder pain and back pain.  Patient has a large kyphotic curve to back, W/C for mobility with reduced ROM.       ALLERGIES:Codeine sulfate [codeine]; Demerol [meperidine]; and Morphine sulfate [morphine]  PAST MEDICAL HISTORY:   has a past medical history of CVD (cerebrovascular disease), Insomnia, Parkinsonism (H), and Restless legs syndrome (RLS).  PAST SURGICAL HISTORY:   has no past surgical history on  "file.  FAMILY HISTORY: family history is not on file.  SOCIAL HISTORY:      MEDICATIONS:  Current Outpatient Medications   Medication Sig Dispense Refill     acetaminophen (TYLENOL) 325 MG tablet Take 3 tablets (975 mg) by mouth 3 times daily And 650 mg daily PRN       clopidogrel (PLAVIX) 75 MG tablet Take 75 mg by mouth daily       erythromycin (ROMYCIN) 5 MG/GM ophthalmic ointment Place 0.25 inches into both eyes At Bedtime       Menthol-Methyl Salicylate (ICY HOT EXTRA STRENGTH) 10-30 % CREA Externally apply topically 3 times daily       mirtazapine (REMERON) 15 MG tablet Take 15 mg by mouth At Bedtime       polyethylene glycol (MIRALAX/GLYCOLAX) packet Take 17 g by mouth every other day       Case Management:  I have reviewed the care plan and MDS and do agree with the plan. Patient's desire to return to the community is not present. Information reviewed:  Medications, vital signs, orders, and nursing notes.    ROS:  Limited secondary to cognitive impairment but today pt reports 10 point ROS of systems including Constitutional, Eyes, Respiratory, Cardiovascular, Gastroenterology, Genitourinary, Integumentary, Musculoskeletal, Psychiatric were all negative except for pertinent positives noted in my HPI.    Vitals:  /64   Pulse 60   Temp 97.5  F (36.4  C)   Resp 18   Ht 1.499 m (4' 11\")   Wt 54.2 kg (119 lb 8 oz)   SpO2 96%   BMI 24.14 kg/m    Body mass index is 24.14 kg/m .  Exam:  GENERAL APPEARANCE:  Alert, in no distress, masked facies  RESP:  respiratory effort and palpation of chest normal, auscultation of lungs clear, no respiratory distress  CV:  Palpation and auscultation of heart done , rate and rhythm regular, no murmur, no LE peripheral edema  ABDOMEN:  normal bowel sounds, soft, nontender, MIRIAM for hepatosplenomegaly or other masses d/t seated, clothed assessment with kyphotic posture  M/S:   Gait and station with w/c for mobility, severely reduced ROM, Digits and nails with significant " arthritic changes, significantly lreduced muscle mass  SKIN:  Inspection and Palpation of skin and subcutaneous tissue pale, dry, poor turgor  PSYCH:  insight and judgement, memory with impairment, affect flat and mood normal, follows commands readily as able         Lab/Diagnostic data:   Labs done [facility] done by [facility]. 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  Weights stable but Body mass index is 24.14 kg/m . and patient is very frail, thin.    Nursing for supportive cares    Parkinsonism, unspecified Parkinsonism type (H)  Stable  No swallowing concern at this time  W/c for mobility so reduced risk of falls.   Follow clinically     Muscle pain  Acquired postural kyphosis  Chronic left shoulder pain  Pain is Marilu's main problem.  Discussion today about possible next step to narcotics with risks of sedation/leathargy and confusion discussed.  It seemed patient did not understand these risks but did report she finds her pain tolerable at this time.  Will monitor closely.  Would re-do steroid injection as it has been >3 months and monitor closely for success.      Patient is stable at this time. No change in regimen.    Electronically signed by:  LYNDSAY Cotton CNP

## 2019-09-04 ENCOUNTER — NURSING HOME VISIT (OUTPATIENT)
Dept: GERIATRICS | Facility: CLINIC | Age: 84
End: 2019-09-04
Payer: COMMERCIAL

## 2019-09-04 VITALS
WEIGHT: 119.5 LBS | BODY MASS INDEX: 24.09 KG/M2 | HEART RATE: 60 BPM | HEIGHT: 59 IN | RESPIRATION RATE: 18 BRPM | DIASTOLIC BLOOD PRESSURE: 64 MMHG | SYSTOLIC BLOOD PRESSURE: 137 MMHG | TEMPERATURE: 97.5 F | OXYGEN SATURATION: 96 %

## 2019-09-04 DIAGNOSIS — M79.10 MUSCLE PAIN: ICD-10-CM

## 2019-09-04 DIAGNOSIS — M40.00 ACQUIRED POSTURAL KYPHOSIS: ICD-10-CM

## 2019-09-04 DIAGNOSIS — M25.512 CHRONIC LEFT SHOULDER PAIN: ICD-10-CM

## 2019-09-04 DIAGNOSIS — F03.90 DEMENTIA WITHOUT BEHAVIORAL DISTURBANCE, UNSPECIFIED DEMENTIA TYPE: Primary | ICD-10-CM

## 2019-09-04 DIAGNOSIS — F51.05 INSOMNIA SECONDARY TO DEPRESSION WITH ANXIETY: ICD-10-CM

## 2019-09-04 DIAGNOSIS — G89.29 CHRONIC LEFT SHOULDER PAIN: ICD-10-CM

## 2019-09-04 DIAGNOSIS — F41.8 INSOMNIA SECONDARY TO DEPRESSION WITH ANXIETY: ICD-10-CM

## 2019-09-04 DIAGNOSIS — G20.C PARKINSONISM, UNSPECIFIED PARKINSONISM TYPE (H): ICD-10-CM

## 2019-09-04 DIAGNOSIS — F41.8 DEPRESSION WITH ANXIETY: ICD-10-CM

## 2019-09-04 PROCEDURE — 99309 SBSQ NF CARE MODERATE MDM 30: CPT | Performed by: NURSE PRACTITIONER

## 2019-09-04 ASSESSMENT — MIFFLIN-ST. JEOR: SCORE: 862.68

## 2019-09-04 NOTE — LETTER
9/4/2019        RE: Marilu Starr  [facility]  [address] [address]        [place] [facility]  Chief Complaint   Patient presents with     care home Regulatory     [place] Medical Record Number:  [id number]  [facility] [[id number]    HPI:    Marilu Starr  is 89 year old (8/9/1928), who is being seen today for a federally mandated E/M visit.  HPI information obtained from: facility chart records, facility staff, patient report and [facility] chart review. Today's concerns are:  Dementia without behavioral disturbance, unspecified dementia type  Depression with anxiety  Insomnia secondary to depression with anxiety  Patient successfully weaned off clonazepam and reports sleeping well.  She is also on mirtazapine 15 mg q HS  PHQ9 - 0  BIMS 10  No behaviors per nursing report.     Parkinsonism, unspecified Parkinsonism type (H)  History of being taken off Sinemet by her Neurologist  W/C bound with masked facies   On regular diet with weights stable around 119.5 lbs.   Not has had drooling in the past, none during today's visit.      Muscle pain  Acquired postural kyphosis  Chronic left shoulder pain  Significant issue with pain management  Patient currently on Tylenol 650 mg daily PRN, 975 mg TID, Icy Hot topically TID  8/28/19 NP visit with Physical Therapy and Massage Therapy ordered  Notes indicate [facility].   Notes also indicate heat working well for patient.   She has had steroid injections in the past with questionable success.    Patient today reporting left shoulder pain and back pain.  Patient has a large kyphotic curve to back, W/C for mobility with reduced ROM.       ALLERGIES:Codeine sulfate [codeine]; Demerol [meperidine]; and Morphine sulfate [morphine]  PAST MEDICAL HISTORY:   has a past medical history of CVD (cerebrovascular disease), Insomnia, Parkinsonism (H),  "and Restless legs syndrome (RLS).  PAST SURGICAL HISTORY:   has no past surgical history on file.  FAMILY HISTORY: family history is not on file.  SOCIAL HISTORY:      MEDICATIONS:  Current Outpatient Medications   Medication Sig Dispense Refill     acetaminophen (TYLENOL) 325 MG tablet Take 3 tablets (975 mg) by mouth 3 times daily And 650 mg daily PRN       clopidogrel (PLAVIX) 75 MG tablet Take 75 mg by mouth daily       erythromycin (ROMYCIN) 5 MG/GM ophthalmic ointment Place 0.25 inches into both eyes At Bedtime       Menthol-Methyl Salicylate (ICY HOT EXTRA STRENGTH) 10-30 % CREA Externally apply topically 3 times daily       mirtazapine (REMERON) 15 MG tablet Take 15 mg by mouth At Bedtime       polyethylene glycol (MIRALAX/GLYCOLAX) packet Take 17 g by mouth every other day       Case Management:  I have reviewed the care plan and MDS and do agree with the plan. Patient's desire to return to the community is not present. Information reviewed:  Medications, vital signs, orders, and nursing notes.    ROS:  Limited secondary to cognitive impairment but today pt reports 10 point ROS of systems including Constitutional, Eyes, Respiratory, Cardiovascular, Gastroenterology, Genitourinary, Integumentary, Musculoskeletal, Psychiatric were all negative except for pertinent positives noted in my HPI.    Vitals:  /64   Pulse 60   Temp 97.5  F (36.4  C)   Resp 18   Ht 1.499 m (4' 11\")   Wt 54.2 kg (119 lb 8 oz)   SpO2 96%   BMI 24.14 kg/m     Body mass index is 24.14 kg/m .  Exam:  GENERAL APPEARANCE:  Alert, in no distress, masked facies  RESP:  respiratory effort and palpation of chest normal, auscultation of lungs clear, no respiratory distress  CV:  Palpation and auscultation of heart done , rate and rhythm regular, no murmur, no LE peripheral edema  ABDOMEN:  normal bowel sounds, soft, nontender, MIRIAM for hepatosplenomegaly or other masses d/t seated, clothed assessment with kyphotic posture  M/S:   Gait " and station with w/c for mobility, severely reduced ROM, Digits and nails with significant arthritic changes, significantly lreduced muscle mass  SKIN:  Inspection and Palpation of skin and subcutaneous tissue pale, dry, poor turgor  PSYCH:  insight and judgement, memory with impairment, affect flat and mood normal, follows commands readily as able         Lab/Diagnostic data:   Labs done [facility] at [facility] done by [facility] lab. 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  Weights stable but Body mass index is 24.14 kg/m . and patient is very frail, thin.    Nursing for supportive cares    Parkinsonism, unspecified Parkinsonism type (H)  Stable  No swallowing concern at this time  W/c for mobility so reduced risk of falls.   Follow clinically     Muscle pain  Acquired postural kyphosis  Chronic left shoulder pain  Pain is Marilu's main problem.  Discussion today about possible next step to narcotics with risks of sedation/leathargy and confusion discussed.  It seemed patient did not understand these risks but did report she finds her pain tolerable at this time.  Will monitor closely.  Would re-do steroid injection as it has been >3 months and monitor closely for success.      Patient is stable at this time. No change in regimen.    Electronically signed by:  LYNDSAY Cotton CNP              Sincerely,        LYNDSAY Ctoton CNP

## 2019-09-16 ENCOUNTER — CLINICAL UPDATE (OUTPATIENT)
Dept: PHARMACY | Facility: CLINIC | Age: 84
End: 2019-09-16

## 2019-09-16 NOTE — PROGRESS NOTES
This patient's medication list and chart were reviewed as part of the service provided by [facility].    Assessment/Recommendations:  1. (Pain):  If pain continues, may consider addition of Diclofenac gel to affected area.  Anti-inflammatory effects of this topical product may be beneficial.  May also consider change in Tylenol walter & prn order to 1000mg four times daily (max of 4gm/day).  Ok to use 4gm as max since patient in facility and not getting tylenol through outside sources.      Sharmin Hayes, Pharm.D.,[facility]  Board Certified Geriatric Pharmacist  Medication Therapy Management Pharmacist  [phone]

## 2019-11-22 ENCOUNTER — NURSING HOME VISIT (OUTPATIENT)
Dept: GERIATRICS | Facility: CLINIC | Age: 84
End: 2019-11-22
Payer: COMMERCIAL

## 2019-11-22 VITALS
RESPIRATION RATE: 18 BRPM | HEART RATE: 61 BPM | TEMPERATURE: 98.2 F | SYSTOLIC BLOOD PRESSURE: 145 MMHG | DIASTOLIC BLOOD PRESSURE: 68 MMHG | OXYGEN SATURATION: 93 %

## 2019-11-22 DIAGNOSIS — F03.90 DEMENTIA WITHOUT BEHAVIORAL DISTURBANCE, UNSPECIFIED DEMENTIA TYPE: ICD-10-CM

## 2019-11-22 DIAGNOSIS — G89.29 CHRONIC LEFT SHOULDER PAIN: Primary | ICD-10-CM

## 2019-11-22 DIAGNOSIS — G20.A1 PARKINSON'S SYNDROME (H): ICD-10-CM

## 2019-11-22 DIAGNOSIS — M25.512 CHRONIC LEFT SHOULDER PAIN: Primary | ICD-10-CM

## 2019-11-22 PROCEDURE — 99308 SBSQ NF CARE LOW MDM 20: CPT | Performed by: INTERNAL MEDICINE

## 2019-11-22 NOTE — LETTER
"    11/22/2019        RE: Marilu Starr  [facility]  [address] [address]        [place] [facility]  PHYSICIAN NOTE    Chief Complaint   Patient presents with     intermediate Regulatory     MD Stallings       HPI:    Marilu Starr is a 89 year old  (8/9/1928), who is being seen today for a federally mandated E/M visit at [facility] . Admitted to LTC in 2015. Since my last visit with her in July, she had successful GDR of Clonazepam 0.25 mg to being completely off of it. Does have frailty, OA, dementia, Parkinsonism and sleep hygiene impairment. Basic labs in May showed unremarkable TSH, CBC and BMP with slightly low vitamin D level at 14. Her weight is recorded to be down about 3# since this summer.  I spoke with RN Katharine who had no concerns. Says she is stable and often doesn't complain. Occasionally she will bring up her shoulder pain to her sons who will then report it to nursing staff. I spoke with Marilu about her shoulder pain and admits to L>R. Feels its chronic but worse with movement. Denies left elbow or wrist/hand pain. IcyHot does seem to help. Nurses tried warm blankets for a short while but Marilu didn't respond to that.  Overall Marilu says she is \"ok\". Denies sleeping concerns today. Feels she eats ok. Says she has 2 sons.     ALLERGIES: Codeine sulfate [codeine]; Demerol [meperidine]; and Morphine sulfate [morphine]    Past Medical History:   Diagnosis Date     CVD (cerebrovascular disease)      Dementia (H)      Insomnia      Parkinsonism (H)      Restless legs syndrome (RLS)         CODE STATUS: DNR/I    MEDICATIONS: Reviewed in [facility]  Current Outpatient Medications   Medication Sig Dispense Refill     acetaminophen (TYLENOL) 325 MG tablet Take 3 tablets (975 mg) by mouth 3 times daily And 650 mg daily PRN       clopidogrel (PLAVIX) 75 MG tablet Take 75 mg by mouth daily       erythromycin (ROMYCIN) 5 MG/GM ophthalmic ointment Place 0.25 inches into both " "eyes At Bedtime       Menthol-Methyl Salicylate (ICY HOT EXTRA STRENGTH) 10-30 % CREA Externally apply topically 3 times daily       mirtazapine (REMERON) 15 MG tablet Take 15 mg by mouth At Bedtime       polyethylene glycol (MIRALAX/GLYCOLAX) packet Take 17 g by mouth every other day         ROS:  Limited secondary to cognitive impairment but today pt reports as per above in HPI    Exam:  BP (!) 145/68   Pulse 61   Temp 98.2  F (36.8  C)   Resp 18   SpO2 93%    Alert, sitting up in wheelchair in NAD  Parkinsonian appearance  HRRR without mrg  Trace edema  Breathing non-labored  Left shoulder with bony hypertrophy and bogginess anteriorly without erythema/excessive warmth, +limited ROM left shoulder  Appears to be old fading scar left shoulder but Marilu doesn't recall [facility]    Lab/Diagnostic Data:    Labs from 5/30/19 as follows:  Vit D 14  TSH 1.5  WBC 8, Hgb 12.7, Plt 256  BMP within normal limits with creatinine 0.57    ASSESSMENT/PLAN:  Chronic left shoulder pain  Continue Tylenol and IcyHot  Per CareEverywhere has h/o left shoulder arthroscopy in the past  PCP has considered repeat steroid injection which would be reasonable to try; unsure how much Marilu comprehends of this but she was agreeable to it today    Parkinsonism vs Parkinson's disease  Per notes she was taken off of Sinemet by neurologist some time ago  She is quite frail and kyphotic with masked facies and hypophonic speech  Not drooling today but has been in the past  No known aspiration events    Cerebrovascular accident (CVA), unspecified mechanism  Is on chronic Plavix; recent CBC within normal limits     Primary insomnia  Remains on Remeron and has h/o RLS too which may be r/t her Parkinsonism  Clonazepam successfully discontinued    Vitamin D deficiency  Slightly low vitamin D - will defer to PCP on supplementation        Electronically signed by:  Rajwinder Harvey, " DO      Sincerely,        Rajwinder Harvey, DO

## 2019-11-22 NOTE — PROGRESS NOTES
"[place] [facility]  PHYSICIAN NOTE    Chief Complaint   Patient presents with     [facility] Regulatory     MD Stallings       HPI:    Marilu Starr is a 89 year old  (8/9/1928), who is being seen today for a federally mandated E/M visit at [facility] . Admitted to LT in 2015. Since my last visit with her in July, she had successful GDR of Clonazepam 0.25 mg to being completely off of it. Does have frailty, OA, dementia, Parkinsonism and sleep hygiene impairment. Basic labs in May showed unremarkable TSH, CBC and BMP with slightly low vitamin D level at 14. Her weight is recorded to be down about 3# since this summer.  I spoke with RN Katharine who had no concerns. Says she is stable and often doesn't complain. Occasionally she will bring up her shoulder pain to her sons who will then report it to nursing staff. I spoke with Marilu about her shoulder pain and admits to L>R. Feels its chronic but worse with movement. Denies left elbow or wrist/hand pain. IcyHot does seem to help. Nurses tried warm blankets for a short while but Marilu didn't respond to that.  Overall Marilu says she is \"ok\". Denies sleeping concerns today. Feels she eats ok. Says she has 2 sons.     ALLERGIES: Codeine sulfate [codeine]; Demerol [meperidine]; and Morphine sulfate [morphine]    Past Medical History:   Diagnosis Date     CVD (cerebrovascular disease)      Dementia (H)      Insomnia      Parkinsonism (H)      Restless legs syndrome (RLS)         CODE STATUS: DNR/I    MEDICATIONS: Reviewed in [facility]  Current Outpatient Medications   Medication Sig Dispense Refill     acetaminophen (TYLENOL) 325 MG tablet Take 3 tablets (975 mg) by mouth 3 times daily And 650 mg daily PRN       clopidogrel (PLAVIX) 75 MG tablet Take 75 mg by mouth daily       erythromycin (ROMYCIN) 5 MG/GM ophthalmic ointment Place 0.25 inches into both eyes At Bedtime       Menthol-Methyl Salicylate (ICY HOT EXTRA STRENGTH) 10-30 % CREA Externally apply " "topically 3 times daily       mirtazapine (REMERON) 15 MG tablet Take 15 mg by mouth At Bedtime       polyethylene glycol (MIRALAX/GLYCOLAX) packet Take 17 g by mouth every other day         ROS:  Limited secondary to cognitive impairment but today pt reports as per above in HPI    Exam:  BP (!) 145/68   Pulse 61   Temp 98.2  F (36.8  C)   Resp 18   SpO2 93%    Alert, sitting up in wheelchair in NAD  Parkinsonian appearance  HRRR without mrg  Trace edema  Breathing non-labored  Left shoulder with bony hypertrophy and bogginess anteriorly without erythema/excessive warmth, +limited ROM left shoulder  Appears to be old fading scar left shoulder but Marilu doesn't recall prior surgery ([facility] remarks on \"left shoulder arthroscopy in 2004)    Lab/Diagnostic Data:    Labs from 5/30/19 as follows:  Vit D 14  TSH 1.5  WBC 8, Hgb 12.7, Plt 256  BMP within normal limits with creatinine 0.57    ASSESSMENT/PLAN:  Chronic left shoulder pain  Continue Tylenol and IcyHot  Per [facility] has h/o left shoulder arthroscopy in the past  PCP has considered repeat steroid injection which would be reasonable to try; unsure how much Marilu comprehends of this but she was agreeable to it today    Parkinsonism vs Parkinson's disease  Per notes she was taken off of Sinemet by neurologist some time ago  She is quite frail and kyphotic with masked facies and hypophonic speech  Not drooling today but has been in the past  No known aspiration events    Cerebrovascular accident (CVA), unspecified mechanism  Is on chronic Plavix; recent CBC within normal limits     Primary insomnia  Remains on Remeron and has h/o RLS too which may be r/t her Parkinsonism  Clonazepam successfully discontinued    Vitamin D deficiency  Slightly low vitamin D - will defer to PCP on supplementation        Electronically signed by:  Rajwinder Harvey,   "

## 2019-11-28 PROBLEM — F03.90 DEMENTIA (H): Status: ACTIVE | Noted: 2019-11-28

## 2019-12-04 NOTE — PROGRESS NOTES
[place] [facility]  [place] Medical Record Number:  [id number]  Place of Service where encounter took place:  [facility] ([facility]) [[id number]]  Chief Complaint   Patient presents with     RECHECK       HPI:    Marilu Starr  is a 89 year old (8/9/1928), who is being seen today for an episodic care visit.  HPI information obtained from: facility chart records, facility staff, patient report, [facility] chart review and family/first contact pt's son's (Marcus's) report. Today's concern is:     Chronic left shoulder pain  Primary osteoarthritis involving multiple joints  Parkinson's syndrome (H)  Dementia without behavioral disturbance, unspecified dementia type (H)     Patient has history of steroid injection in left shoulder for degenerative arthritis, last given 12/27/18.  Imagining confirmed degenerative changes.  Unfortunately patient does not remember if this gave her pain relief, d/t dementia.      Spoke with patient's son today (Marcus) who endorsed that if the injection would give her pain relief without systemic S/Es, would agree/recommend it.           Past Medical and Surgical History reviewed in Epic today.    MEDICATIONS:  Current Outpatient Medications   Medication Sig Dispense Refill     acetaminophen (TYLENOL) 325 MG tablet Take 3 tablets (975 mg) by mouth 3 times daily And 650 mg daily PRN       clopidogrel (PLAVIX) 75 MG tablet Take 75 mg by mouth daily       erythromycin (ROMYCIN) 5 MG/GM ophthalmic ointment Place 0.25 inches into both eyes At Bedtime       Menthol-Methyl Salicylate (ICY HOT EXTRA STRENGTH) 10-30 % CREA Externally apply topically 3 times daily       mirtazapine (REMERON) 15 MG tablet Take 15 mg by mouth At Bedtime       polyethylene glycol (MIRALAX/GLYCOLAX) packet Take 17 g by mouth every other day       REVIEW OF SYSTEMS:  Limited secondary to cognitive impairment but today pt reports 4 point ROS including Respiratory, CV, GI and , other than that noted in the HPI,  is  "negative    Objective:  /75   Pulse 64   Temp 98.5  F (36.9  C)   Resp 20   Ht 1.499 m (4' 11\")   Wt 52.3 kg (115 lb 3.2 oz)   SpO2 95%   BMI 23.27 kg/m    Exam:  GENERAL APPEARANCE:  Alert, in no distress  RESP:  respiratory effort normal, no respiratory distress  CV:  No LE peripheral edema  ABDOMEN:  nondistended  M/S:   Gait and station with W/C for mobility, significant contracture of body, Digits and nails with much reduced ROM, significantly reduced muscle mass  SKIN:  Inspection and Palpation of skin and subcutaneous tissue pale, intact  PSYCH:  insight and judgement, memory with impairment, affect and mood normal, follows commands readily       Labs:   reviewed in SNF EHR    ASSESSMENT/PLAN:     Chronic left shoulder pain  Primary osteoarthritis involving multiple joints  Parkinson's syndrome (H)  Dementia without behavioral disturbance, unspecified dementia type (H)     Risks vs benefits of injection discussed including but not limited to infection, contents of injection, expected results of injection.  Patient elected to proceed.  Using sterile technique, the left shoulder was prepped with alcohol.  A 25 gauge needle was used to inject 40 mg DepoMedrol and 4 cc of 1% lidocaine into left shoulder, using posterior approach.  Slight aspiration before injection of steroids/lidocaine assured no blood vessel involvement. Patient tolerated the procedure well and without complications. No bleeding noted but band-aid applied.        Electronically signed by:  LYNDSAY Cotton CNP           "

## 2019-12-05 ENCOUNTER — NURSING HOME VISIT (OUTPATIENT)
Dept: GERIATRICS | Facility: CLINIC | Age: 84
End: 2019-12-05
Payer: COMMERCIAL

## 2019-12-05 VITALS
HEIGHT: 59 IN | HEART RATE: 64 BPM | BODY MASS INDEX: 23.22 KG/M2 | SYSTOLIC BLOOD PRESSURE: 129 MMHG | WEIGHT: 115.2 LBS | DIASTOLIC BLOOD PRESSURE: 75 MMHG | OXYGEN SATURATION: 95 % | RESPIRATION RATE: 20 BRPM | TEMPERATURE: 98.5 F

## 2019-12-05 DIAGNOSIS — G89.29 CHRONIC LEFT SHOULDER PAIN: Primary | ICD-10-CM

## 2019-12-05 DIAGNOSIS — G20.A1 PARKINSON'S SYNDROME (H): ICD-10-CM

## 2019-12-05 DIAGNOSIS — M15.0 PRIMARY OSTEOARTHRITIS INVOLVING MULTIPLE JOINTS: ICD-10-CM

## 2019-12-05 DIAGNOSIS — F03.90 DEMENTIA WITHOUT BEHAVIORAL DISTURBANCE, UNSPECIFIED DEMENTIA TYPE: ICD-10-CM

## 2019-12-05 DIAGNOSIS — M25.512 CHRONIC LEFT SHOULDER PAIN: Primary | ICD-10-CM

## 2019-12-05 PROCEDURE — 20610 DRAIN/INJ JOINT/BURSA W/O US: CPT | Performed by: NURSE PRACTITIONER

## 2019-12-05 RX ORDER — METHYLPREDNISOLONE ACETATE 40 MG/ML
40 INJECTION, SUSPENSION INTRA-ARTICULAR; INTRALESIONAL; INTRAMUSCULAR; SOFT TISSUE ONCE
Status: COMPLETED | OUTPATIENT
Start: 2019-12-05 | End: 2019-12-05

## 2019-12-05 RX ORDER — LIDOCAINE HYDROCHLORIDE 10 MG/ML
4 INJECTION, SOLUTION INFILTRATION; PERINEURAL ONCE
Status: COMPLETED | OUTPATIENT
Start: 2019-12-05 | End: 2019-12-05

## 2019-12-05 RX ADMIN — LIDOCAINE HYDROCHLORIDE 4 ML: 10 INJECTION, SOLUTION INFILTRATION; PERINEURAL at 13:16

## 2019-12-05 RX ADMIN — METHYLPREDNISOLONE ACETATE 40 MG: 40 INJECTION, SUSPENSION INTRA-ARTICULAR; INTRALESIONAL; INTRAMUSCULAR; SOFT TISSUE at 13:16

## 2019-12-05 ASSESSMENT — MIFFLIN-ST. JEOR: SCORE: 843.17

## 2019-12-05 NOTE — LETTER
12/5/2019        RE: Marilu Starr  [facility]  [address] [address]        [place] [facility]  [place] Medical Record Number:  [id number]  Place of Service where encounter took place:  [facility] ([facility]) [[id number]]  Chief Complaint   Patient presents with     RECHECK       HPI:    Marilu Starr  is a 89 year old (8/9/1928), who is being seen today for an episodic care visit.  HPI information obtained from: facility chart records, facility staff, patient report, [facility] chart review and family/first contact pt's son's (Marcus's) report. Today's concern is:     Chronic left shoulder pain  Primary osteoarthritis involving multiple joints  Parkinson's syndrome (H)  Dementia without behavioral disturbance, unspecified dementia type (H)     Patient has history of steroid injection in left shoulder for degenerative arthritis, last given 12/27/18.  Imagining confirmed degenerative changes.  Unfortunately patient does not remember if this gave her pain relief, d/t dementia.      Spoke with patient's son today (Marcus) who endorsed that if the injection would give her pain relief without systemic S/Es, would agree/recommend it.           Past Medical and Surgical History reviewed in Epic today.    MEDICATIONS:  Current Outpatient Medications   Medication Sig Dispense Refill     acetaminophen (TYLENOL) 325 MG tablet Take 3 tablets (975 mg) by mouth 3 times daily And 650 mg daily PRN       clopidogrel (PLAVIX) 75 MG tablet Take 75 mg by mouth daily       erythromycin (ROMYCIN) 5 MG/GM ophthalmic ointment Place 0.25 inches into both eyes At Bedtime       Menthol-Methyl Salicylate (ICY HOT EXTRA STRENGTH) 10-30 % CREA Externally apply topically 3 times daily       mirtazapine (REMERON) 15 MG tablet Take 15 mg by mouth At Bedtime       polyethylene glycol (MIRALAX/GLYCOLAX) packet Take 17 g by mouth every other day       REVIEW OF SYSTEMS:  Limited secondary to cognitive impairment but today  "pt reports 4 point ROS including Respiratory, CV, GI and , other than that noted in the HPI,  is negative    Objective:  /75   Pulse 64   Temp 98.5  F (36.9  C)   Resp 20   Ht 1.499 m (4' 11\")   Wt 52.3 kg (115 lb 3.2 oz)   SpO2 95%   BMI 23.27 kg/m     Exam:  GENERAL APPEARANCE:  Alert, in no distress  RESP:  respiratory effort normal, no respiratory distress  CV:  No LE peripheral edema  ABDOMEN:  nondistended  M/S:   Gait and station with W/C for mobility, significant contracture of body, Digits and nails with much reduced ROM, significantly reduced muscle mass  SKIN:  Inspection and Palpation of skin and subcutaneous tissue pale, intact  PSYCH:  insight and judgement, memory with impairment, affect and mood normal, follows commands readily       Labs:   reviewed in SNF EHR    ASSESSMENT/PLAN:     Chronic left shoulder pain  Primary osteoarthritis involving multiple joints  Parkinson's syndrome (H)  Dementia without behavioral disturbance, unspecified dementia type (H)     Risks vs benefits of injection discussed including but not limited to infection, contents of injection, expected results of injection.  Patient elected to proceed.  Using sterile technique, the left shoulder was prepped with alcohol.  A 25 gauge needle was used to inject 40 mg DepoMedrol and 4 cc of 1% lidocaine into left shoulder, using posterior approach.  Slight aspiration before injection of steroids/lidocaine assured no blood vessel involvement. Patient tolerated the procedure well and without complications. No bleeding noted but band-aid applied.        Electronically signed by:  LYNDSAY Cotton CNP               Sincerely,        LYNDSAY Cotton CNP    "

## 2020-01-01 ENCOUNTER — VIRTUAL VISIT (OUTPATIENT)
Dept: GERIATRICS | Facility: CLINIC | Age: 85
End: 2020-01-01
Payer: COMMERCIAL

## 2020-01-01 ENCOUNTER — RECORDS - HEALTHEAST (OUTPATIENT)
Dept: LAB | Facility: CLINIC | Age: 85
End: 2020-01-01

## 2020-01-01 ENCOUNTER — NURSING HOME VISIT (OUTPATIENT)
Dept: GERIATRICS | Facility: CLINIC | Age: 85
End: 2020-01-01
Payer: COMMERCIAL

## 2020-01-01 VITALS
BODY MASS INDEX: 22.75 KG/M2 | SYSTOLIC BLOOD PRESSURE: 98 MMHG | RESPIRATION RATE: 18 BRPM | HEART RATE: 67 BPM | DIASTOLIC BLOOD PRESSURE: 62 MMHG | TEMPERATURE: 97.4 F | HEIGHT: 58 IN | OXYGEN SATURATION: 96 % | WEIGHT: 108.4 LBS

## 2020-01-01 VITALS
HEIGHT: 58 IN | OXYGEN SATURATION: 97 % | TEMPERATURE: 97.6 F | HEART RATE: 97 BPM | BODY MASS INDEX: 21.66 KG/M2 | WEIGHT: 103.2 LBS | DIASTOLIC BLOOD PRESSURE: 73 MMHG | RESPIRATION RATE: 16 BRPM | SYSTOLIC BLOOD PRESSURE: 132 MMHG

## 2020-01-01 VITALS
HEART RATE: 67 BPM | WEIGHT: 108.4 LBS | TEMPERATURE: 98.3 F | BODY MASS INDEX: 22.75 KG/M2 | SYSTOLIC BLOOD PRESSURE: 98 MMHG | RESPIRATION RATE: 18 BRPM | HEIGHT: 58 IN | OXYGEN SATURATION: 96 % | DIASTOLIC BLOOD PRESSURE: 62 MMHG

## 2020-01-01 VITALS
SYSTOLIC BLOOD PRESSURE: 137 MMHG | WEIGHT: 108.3 LBS | HEIGHT: 58 IN | HEART RATE: 67 BPM | RESPIRATION RATE: 18 BRPM | DIASTOLIC BLOOD PRESSURE: 68 MMHG | BODY MASS INDEX: 22.73 KG/M2 | OXYGEN SATURATION: 92 % | TEMPERATURE: 98.2 F

## 2020-01-01 VITALS
WEIGHT: 106.2 LBS | SYSTOLIC BLOOD PRESSURE: 126 MMHG | BODY MASS INDEX: 22.2 KG/M2 | DIASTOLIC BLOOD PRESSURE: 62 MMHG | HEART RATE: 66 BPM | OXYGEN SATURATION: 97 % | RESPIRATION RATE: 16 BRPM | TEMPERATURE: 97.4 F

## 2020-01-01 DIAGNOSIS — M25.512 CHRONIC LEFT SHOULDER PAIN: ICD-10-CM

## 2020-01-01 DIAGNOSIS — Z78.9 IMPAIRED MOBILITY AND ADLS: ICD-10-CM

## 2020-01-01 DIAGNOSIS — R63.4 WEIGHT LOSS: ICD-10-CM

## 2020-01-01 DIAGNOSIS — M25.561 BILATERAL CHRONIC KNEE PAIN: ICD-10-CM

## 2020-01-01 DIAGNOSIS — F51.05 INSOMNIA SECONDARY TO DEPRESSION WITH ANXIETY: ICD-10-CM

## 2020-01-01 DIAGNOSIS — G89.29 BILATERAL CHRONIC KNEE PAIN: ICD-10-CM

## 2020-01-01 DIAGNOSIS — R54 FRAILTY: ICD-10-CM

## 2020-01-01 DIAGNOSIS — M15.0 PRIMARY OSTEOARTHRITIS INVOLVING MULTIPLE JOINTS: ICD-10-CM

## 2020-01-01 DIAGNOSIS — K58.9 IRRITABLE BOWEL SYNDROME, UNSPECIFIED TYPE: ICD-10-CM

## 2020-01-01 DIAGNOSIS — F41.8 INSOMNIA SECONDARY TO DEPRESSION WITH ANXIETY: ICD-10-CM

## 2020-01-01 DIAGNOSIS — I63.9 CEREBROVASCULAR ACCIDENT (CVA), UNSPECIFIED MECHANISM (H): ICD-10-CM

## 2020-01-01 DIAGNOSIS — G20.C PARKINSONISM, UNSPECIFIED PARKINSONISM TYPE (H): ICD-10-CM

## 2020-01-01 DIAGNOSIS — F41.8 DEPRESSION WITH ANXIETY: ICD-10-CM

## 2020-01-01 DIAGNOSIS — F03.90 DEMENTIA WITHOUT BEHAVIORAL DISTURBANCE, UNSPECIFIED DEMENTIA TYPE: ICD-10-CM

## 2020-01-01 DIAGNOSIS — H34.9 RETINAL VASCULAR OCCLUSION OF LEFT EYE: ICD-10-CM

## 2020-01-01 DIAGNOSIS — K59.01 SLOW TRANSIT CONSTIPATION: ICD-10-CM

## 2020-01-01 DIAGNOSIS — G89.29 CHRONIC LEFT SHOULDER PAIN: ICD-10-CM

## 2020-01-01 DIAGNOSIS — R63.4 WEIGHT LOSS: Primary | ICD-10-CM

## 2020-01-01 DIAGNOSIS — Z74.09 IMPAIRED MOBILITY AND ADLS: ICD-10-CM

## 2020-01-01 DIAGNOSIS — F03.90 DEMENTIA WITHOUT BEHAVIORAL DISTURBANCE, UNSPECIFIED DEMENTIA TYPE: Primary | ICD-10-CM

## 2020-01-01 DIAGNOSIS — M25.562 BILATERAL CHRONIC KNEE PAIN: ICD-10-CM

## 2020-01-01 DIAGNOSIS — G20.A1 PARKINSON'S SYNDROME (H): Primary | ICD-10-CM

## 2020-01-01 DIAGNOSIS — Z86.73 HISTORY OF CVA (CEREBROVASCULAR ACCIDENT): ICD-10-CM

## 2020-01-01 DIAGNOSIS — E55.9 VITAMIN D DEFICIENCY: ICD-10-CM

## 2020-01-01 LAB
ANION GAP SERPL CALCULATED.3IONS-SCNC: 10 MMOL/L (ref 5–18)
BUN SERPL-MCNC: 17 MG/DL (ref 8–28)
CALCIUM SERPL-MCNC: 9.6 MG/DL (ref 8.5–10.5)
CHLORIDE BLD-SCNC: 105 MMOL/L (ref 98–107)
CO2 SERPL-SCNC: 27 MMOL/L (ref 22–31)
CREAT SERPL-MCNC: 0.63 MG/DL (ref 0.6–1.1)
GFR SERPL CREATININE-BSD FRML MDRD: >60 ML/MIN/1.73M2
GLUCOSE BLD-MCNC: 70 MG/DL (ref 70–125)
POTASSIUM BLD-SCNC: 4.5 MMOL/L (ref 3.5–5)
SODIUM SERPL-SCNC: 142 MMOL/L (ref 136–145)

## 2020-01-01 PROCEDURE — 99308 SBSQ NF CARE LOW MDM 20: CPT | Performed by: INTERNAL MEDICINE

## 2020-01-01 PROCEDURE — 99309 SBSQ NF CARE MODERATE MDM 30: CPT | Mod: 95 | Performed by: NURSE PRACTITIONER

## 2020-01-01 PROCEDURE — 99309 SBSQ NF CARE MODERATE MDM 30: CPT | Performed by: NURSE PRACTITIONER

## 2020-01-01 PROCEDURE — 99308 SBSQ NF CARE LOW MDM 20: CPT | Mod: 95 | Performed by: INTERNAL MEDICINE

## 2020-01-01 RX ORDER — MIRTAZAPINE 15 MG/1
30 TABLET, FILM COATED ORAL AT BEDTIME
Start: 2020-01-01

## 2020-01-01 ASSESSMENT — MIFFLIN-ST. JEOR
SCORE: 796.45
SCORE: 796
SCORE: 767.86
SCORE: 796.45

## 2020-01-05 ENCOUNTER — TELEPHONE (OUTPATIENT)
Dept: GERIATRICS | Facility: CLINIC | Age: 85
End: 2020-01-05

## 2020-01-05 NOTE — PROGRESS NOTES
"[place] [facility]  Chief Complaint   Patient presents with     custodial Regulatory     [place] Medical Record Number:  [id number]  Place of Service where encounter took place:  [facility] ([facility]) [[id number]]    HPI:    Marilu Starr  is 89 year old (8/9/1928), who is being seen today for a federally mandated E/M visit.  HPI information obtained from: facility chart records, facility staff, patient report and [facility] chart review. Today's concerns are:  Dementia without behavioral disturbance, unspecified dementia type (H)  Depression with anxiety  Insomnia secondary to depression with anxiety  Patient resides in LTC SNF for supportive cares  She was successfully weaned off clonazepam but then started on mirtazapine 15 mg q HS for weight loss and insomnia  She is on regular diet, W/C bound d/t Parkinsonism (see below)  BIMS 5, PHQ9 - 0  Recent diagnosis of UTI with antibiotics started - family reported abnormal behavior.    Patient today reports she does not sleep well (\"never have\") but takes naps during the day and overall feels rested (may be a poor historian d/t cognitive impairment).  She reports having a good appetite.      Parkinson's syndrome (H)  History of being taken off Sinemet by her Neurologist  W/c bound with masked facies, some drooling  On Regular diet  Weights were stable ~119 but now decreased over last 2 months to 115 lbs.     Today she has some excessive secretions in her mouth but during our visit was not drooling.      Acute cystitis without hematuria  Family requested patient be tested for UTI and noted if not possible in SNF, could take out to clinic but preferred not to. Family reported history of recurrent UTIs when patient lived with her son.  He reported recent \"displaying the same crazy thought process that I experienced when she lived with medisplaying the same crazy thought process that I experienced when she lived with me.\"   UA/UC showing >100 K Klebsiella, >100 " K Aerococcus with sensitivities to cephalosporins  1/5/19 - Keflex 500 mg TID x 7 days prescribed by on-call.     Patient denies any symptoms including dysuria, frequency, urgency (may be a poor historian).     Chronic left shoulder pain  Primary osteoarthritis involving multiple joints  Patient has history of chronic right shoulder pain  Imagining confirmed degenerative changes.  Last given steroid injection 12/5/19 - patient today reporting she feels like her shoulder still hurts quite a lot but does think the injection has helped a bit (may be a poor historian)  Other analgesia includes Tylenol 650 mg daily PRN, 975 mg TID, IcyHot to bilat shoulders and neck TID    ROM still quite painful and very minimal PROM possible.      Vitamin D Deficiency  Recent lab showing level - 14  Patient is not on a vitamin D supplement.  She is w/c-bound.       ALLERGIES:Codeine sulfate [codeine]; Demerol [meperidine]; and Morphine sulfate [morphine]  PAST MEDICAL HISTORY:   has a past medical history of CVD (cerebrovascular disease), Dementia (H), Insomnia, Parkinsonism (H), and Restless legs syndrome (RLS).  PAST SURGICAL HISTORY:   has a past surgical history that includes Right knee arthroplasty and Left shoulder arthroscopy (Left).  FAMILY HISTORY: family history is not on file.  SOCIAL HISTORY:      MEDICATIONS:  Current Outpatient Medications   Medication Sig Dispense Refill     acetaminophen (TYLENOL) 325 MG tablet Take 3 tablets (975 mg) by mouth 3 times daily And 650 mg daily PRN       amoxicillin (AMOXIL) 500 MG capsule Take 4 capsules (2,000 mg) by mouth once as needed (prior to dental procedure, as directed by DENTAL staff)       cephALEXin (KEFLEX) 500 MG capsule Take 500 mg by mouth 3 times daily       clopidogrel (PLAVIX) 75 MG tablet Take 75 mg by mouth daily       erythromycin (ROMYCIN) 5 MG/GM ophthalmic ointment Place 0.25 inches into both eyes At Bedtime       Menthol-Methyl Salicylate (ICY HOT EXTRA STRENGTH)  "10-30 % CREA Externally apply topically 3 times daily       mirtazapine (REMERON) 15 MG tablet Take 15 mg by mouth At Bedtime       polyethylene glycol (MIRALAX/GLYCOLAX) packet Take 17 g by mouth every other day       Vitamin D3 (CHOLECALCIFEROL) 25 mcg (1000 units) tablet Take 1 tablet (25 mcg) by mouth daily       Case Management:  I have reviewed the care plan and MDS and do agree with the plan. Patient's desire to return to the community is not assessible due to cognitive impairment. Information reviewed:  Medications, vital signs, orders, and nursing notes.    ROS:  Limited secondary to cognitive impairment but today pt reports 10 point ROS of systems including Constitutional, Eyes, Respiratory, Cardiovascular, Gastroenterology, Genitourinary, Integumentary, Musculoskeletal, Psychiatric were all negative except for pertinent positives noted in my HPI.    Vitals:  /70   Pulse 58   Temp 98.4  F (36.9  C)   Resp 20   Ht 1.499 m (4' 11\")   Wt 52.3 kg (115 lb 3.2 oz)   SpO2 95%   BMI 23.27 kg/m    Body mass index is 23.27 kg/m .  Exam:  GENERAL APPEARANCE:  Alert, in no distress, frail, thin, elderly  HEENT: some excessive secretions in oral but no drooling today.   RESP:  respiratory effort and palpation of chest normal, auscultation of lungs clear with dim bases , no respiratory distress,   CV:  Palpation and auscultation of heart done , rate and rhythm regular, no murmur, no LE peripheral edema  ABDOMEN:  normal bowel sounds, soft, nontender, no hepatosplenomegaly or other masses  M/S:   Gait and station with w/c for mobility, lift for transfers, Digits and nails with arthritic changes, reduced muscle mass  SKIN:  Inspection and Palpation of skin and subcutaneous tissue pale, fragile, thin  PSYCH:  insight and judgement, memory with impairment, affect and mood normal, follows commands readily       Lab/Diagnostic data:   Labs done while at [facility] done by [facility]. " Pertinent results are: reviewed in SNF EHR    ASSESSMENT/PLAN  Dementia without behavioral disturbance, unspecified dementia type (H)  Depression with anxiety  Insomnia secondary to depression with anxiety  No GDR of mirtazapine at this time as patient undergoing treatment for UTI with behavior changes her family.  She also reports poor sleep.   If in future thinking about GDR of mirtazapine, could order sleep study/log to monitor sleep as well.  Med also being used for weight loss and over last 2 months, weight loss noted so no GDR of mirtazapine.    Nursing in SNF for supportive cares.    Parkinson's syndrome (H)  Stable without treatment.  Progressive.  Monitor.     Acute cystitis without hematuria  Treatment in place with antibiotics.   Monitor.     Chronic left shoulder pain  Primary osteoarthritis involving multiple joints  Possible that steroid injection may have helped but unclear.    Continue Tylenol and IcyHot    Vitamin D Deficiency  Can add vitamin supplement as deficiency noted on labs.       Orders written by provider at facility  1. Vitamin D deficiency 1000 units po daily Dx: vitamin d deficiency  2. Amoxicillin 2 gm po prior to dental procedures PRN.  Hold until directed by DENTAL STAFF to give.       Electronically signed by:  LYNDSAY Cotton CNP

## 2020-01-05 NOTE — TELEPHONE ENCOUNTER
UC positive: sensitive to cephalosporins    PLAN  Start Keflex 500 mg PO TID x 7 days    Electronically signed by LYNDSAY Snowden, GNP

## 2020-01-06 ENCOUNTER — NURSING HOME VISIT (OUTPATIENT)
Dept: GERIATRICS | Facility: CLINIC | Age: 85
End: 2020-01-06
Payer: COMMERCIAL

## 2020-01-06 VITALS
HEART RATE: 58 BPM | RESPIRATION RATE: 20 BRPM | WEIGHT: 115.2 LBS | BODY MASS INDEX: 23.22 KG/M2 | HEIGHT: 59 IN | OXYGEN SATURATION: 95 % | TEMPERATURE: 98.4 F | SYSTOLIC BLOOD PRESSURE: 115 MMHG | DIASTOLIC BLOOD PRESSURE: 70 MMHG

## 2020-01-06 DIAGNOSIS — F41.8 DEPRESSION WITH ANXIETY: ICD-10-CM

## 2020-01-06 DIAGNOSIS — E55.9 VITAMIN D DEFICIENCY: ICD-10-CM

## 2020-01-06 DIAGNOSIS — M25.512 CHRONIC LEFT SHOULDER PAIN: ICD-10-CM

## 2020-01-06 DIAGNOSIS — N30.00 ACUTE CYSTITIS WITHOUT HEMATURIA: ICD-10-CM

## 2020-01-06 DIAGNOSIS — G89.29 CHRONIC LEFT SHOULDER PAIN: ICD-10-CM

## 2020-01-06 DIAGNOSIS — G20.A1 PARKINSON'S SYNDROME (H): ICD-10-CM

## 2020-01-06 DIAGNOSIS — F41.8 INSOMNIA SECONDARY TO DEPRESSION WITH ANXIETY: ICD-10-CM

## 2020-01-06 DIAGNOSIS — F51.05 INSOMNIA SECONDARY TO DEPRESSION WITH ANXIETY: ICD-10-CM

## 2020-01-06 DIAGNOSIS — F03.90 DEMENTIA WITHOUT BEHAVIORAL DISTURBANCE, UNSPECIFIED DEMENTIA TYPE: Primary | ICD-10-CM

## 2020-01-06 DIAGNOSIS — Z79.2 NEED FOR PROPHYLACTIC ANTIBIOTIC: ICD-10-CM

## 2020-01-06 DIAGNOSIS — M15.0 PRIMARY OSTEOARTHRITIS INVOLVING MULTIPLE JOINTS: ICD-10-CM

## 2020-01-06 PROCEDURE — 99309 SBSQ NF CARE MODERATE MDM 30: CPT | Performed by: NURSE PRACTITIONER

## 2020-01-06 RX ORDER — CEPHALEXIN 500 MG/1
500 CAPSULE ORAL 3 TIMES DAILY
COMMUNITY
Start: 2020-01-05 | End: 2020-01-01

## 2020-01-06 RX ORDER — VITAMIN B COMPLEX
25 TABLET ORAL DAILY
Start: 2020-01-06 | End: 2021-01-01

## 2020-01-06 RX ORDER — AMOXICILLIN 500 MG/1
2000 CAPSULE ORAL
Start: 2020-01-06

## 2020-01-06 ASSESSMENT — MIFFLIN-ST. JEOR: SCORE: 843.17

## 2020-01-06 NOTE — LETTER
"    1/6/2020        RE: Marilu Starr  [facility]  [address] [address]        [place] [facility]  Chief Complaint   Patient presents with     halfway Regulatory     [place] Medical Record Number:  [id number]  Place of Service where encounter took place:  [facility] ([facility]) [[id number]]    HPI:    Marilu Starr  is 89 year old (8/9/1928), who is being seen today for a federally mandated E/M visit.  HPI information obtained from: facility chart records, facility staff, patient report and [facility] chart review. Today's concerns are:  Dementia without behavioral disturbance, unspecified dementia type (H)  Depression with anxiety  Insomnia secondary to depression with anxiety  Patient resides in LTC SNF for supportive cares  She was successfully weaned off clonazepam but then started on mirtazapine 15 mg q HS for weight loss and insomnia  She is on regular diet, W/C bound d/t Parkinsonism (see below)  BIMS 5, PHQ9 - 0  Recent diagnosis of UTI with antibiotics started - family reported abnormal behavior.    Patient today reports she does not sleep well (\"never have\") but takes naps during the day and overall feels rested (may be a poor historian d/t cognitive impairment).  She reports having a good appetite.      Parkinson's syndrome (H)  History of being taken off Sinemet by her Neurologist  W/c bound with masked facies, some drooling  On Regular diet  Weights were stable ~119 but now decreased over last 2 months to 115 lbs.     Today she has some excessive secretions in her mouth but during our visit was not drooling.      Acute cystitis without hematuria  Family requested patient be tested for UTI and noted if not possible in SNF, could take out to clinic but preferred not to. Family reported history of recurrent UTIs when patient lived with her son.  He reported recent \"displaying the same crazy thought process that I experienced when she lived with medisplaying the same " "crazy thought process that I experienced when she lived with me.\"   UA/UC showing >100 K Klebsiella, >100 K Aerococcus with sensitivities to cephalosporins  1/5/19 - Keflex 500 mg TID x 7 days prescribed by on-call.     Patient denies any symptoms including dysuria, frequency, urgency (may be a poor historian).     Chronic left shoulder pain  Primary osteoarthritis involving multiple joints  Patient has history of chronic right shoulder pain  Imagining confirmed degenerative changes.  Last given steroid injection 12/5/19 - patient today reporting she feels like her shoulder still hurts quite a lot but does think the injection has helped a bit (may be a poor historian)  Other analgesia includes Tylenol 650 mg daily PRN, 975 mg TID, IcyHot to bilat shoulders and neck TID    ROM still quite painful and very minimal PROM possible.      Vitamin D Deficiency  Recent lab showing level - 14  Patient is not on a vitamin D supplement.  She is w/c-bound.       ALLERGIES:Codeine sulfate [codeine]; Demerol [meperidine]; and Morphine sulfate [morphine]  PAST MEDICAL HISTORY:   has a past medical history of CVD (cerebrovascular disease), Dementia (H), Insomnia, Parkinsonism (H), and Restless legs syndrome (RLS).  PAST SURGICAL HISTORY:   has a past surgical history that includes Right knee arthroplasty and Left shoulder arthroscopy (Left).  FAMILY HISTORY: family history is not on file.  SOCIAL HISTORY:      MEDICATIONS:  Current Outpatient Medications   Medication Sig Dispense Refill     acetaminophen (TYLENOL) 325 MG tablet Take 3 tablets (975 mg) by mouth 3 times daily And 650 mg daily PRN       amoxicillin (AMOXIL) 500 MG capsule Take 4 capsules (2,000 mg) by mouth once as needed (prior to dental procedure, as directed by DENTAL staff)       cephALEXin (KEFLEX) 500 MG capsule Take 500 mg by mouth 3 times daily       clopidogrel (PLAVIX) 75 MG tablet Take 75 mg by mouth daily       erythromycin (ROMYCIN) 5 MG/GM ophthalmic " "ointment Place 0.25 inches into both eyes At Bedtime       Menthol-Methyl Salicylate (ICY HOT EXTRA STRENGTH) 10-30 % CREA Externally apply topically 3 times daily       mirtazapine (REMERON) 15 MG tablet Take 15 mg by mouth At Bedtime       polyethylene glycol (MIRALAX/GLYCOLAX) packet Take 17 g by mouth every other day       Vitamin D3 (CHOLECALCIFEROL) 25 mcg (1000 units) tablet Take 1 tablet (25 mcg) by mouth daily       Case Management:  I have reviewed the care plan and MDS and do agree with the plan. Patient's desire to return to the community is not assessible due to cognitive impairment. Information reviewed:  Medications, vital signs, orders, and nursing notes.    ROS:  Limited secondary to cognitive impairment but today pt reports 10 point ROS of systems including Constitutional, Eyes, Respiratory, Cardiovascular, Gastroenterology, Genitourinary, Integumentary, Musculoskeletal, Psychiatric were all negative except for pertinent positives noted in my HPI.    Vitals:  /70   Pulse 58   Temp 98.4  F (36.9  C)   Resp 20   Ht 1.499 m (4' 11\")   Wt 52.3 kg (115 lb 3.2 oz)   SpO2 95%   BMI 23.27 kg/m     Body mass index is 23.27 kg/m .  Exam:  GENERAL APPEARANCE:  Alert, in no distress, frail, thin, elderly  HEENT: some excessive secretions in oral but no drooling today.   RESP:  respiratory effort and palpation of chest normal, auscultation of lungs clear with dim bases , no respiratory distress,   CV:  Palpation and auscultation of heart done , rate and rhythm regular, no murmur, no LE peripheral edema  ABDOMEN:  normal bowel sounds, soft, nontender, no hepatosplenomegaly or other masses  M/S:   Gait and station with w/c for mobility, lift for transfers, Digits and nails with arthritic changes, reduced muscle mass  SKIN:  Inspection and Palpation of skin and subcutaneous tissue pale, fragile, thin  PSYCH:  insight and judgement, memory with impairment, affect and mood normal, follows commands " readily       Lab/Diagnostic data:   Labs done while at [facility] done by [facility] lab. Pertinent results are: reviewed in SNF EHR    ASSESSMENT/PLAN  Dementia without behavioral disturbance, unspecified dementia type (H)  Depression with anxiety  Insomnia secondary to depression with anxiety  No GDR of mirtazapine at this time as patient undergoing treatment for UTI with behavior changes her family.  She also reports poor sleep.   If in future thinking about GDR of mirtazapine, could order sleep study/log to monitor sleep as well.  Med also being used for weight loss and over last 2 months, weight loss noted so no GDR of mirtazapine.    Nursing in SNF for supportive cares.    Parkinson's syndrome (H)  Stable without treatment.  Progressive.  Monitor.     Acute cystitis without hematuria  Treatment in place with antibiotics.   Monitor.     Chronic left shoulder pain  Primary osteoarthritis involving multiple joints  Possible that steroid injection may have helped but unclear.    Continue Tylenol and IcyHot    Vitamin D Deficiency  Can add vitamin supplement as deficiency noted on labs.       Orders written by provider at facility  1. Vitamin D deficiency 1000 units po daily Dx: vitamin d deficiency  2. Amoxicillin 2 gm po prior to dental procedures PRN.  Hold until directed by DENTAL STAFF to give.       Electronically signed by:  LYNDSAY Cotton CNP              Sincerely,        LYNDSAY Cotton CNP

## 2020-04-23 PROBLEM — G20.C PARKINSONISM (H): Status: ACTIVE | Noted: 2018-12-24

## 2020-04-23 NOTE — PROGRESS NOTES
"[place] [facility]  REGULATORY  Marilu Starr is being evaluated via a billable video visit due to the restrictions of the Covid-19 pandemic.   The patient has been notified of following:\"This video visit will be conducted via a call between you and your provider. We have found that certain health care needs can be provided without the need for an in-person physical exam.  This service lets us provide the care you need with a video conversation. If during the course of the call the provider feels a video visit is not appropriate, you will not be charged for this service.\"   The provider has received verbal consent for a Video Visit from the patient or first contact? Yes    Video Start Time: 12:00 PM    [place] Medical Record Number:  [id number]  Place of Location at the time of visit: [facility]   Chief Complaint   Patient presents with     senior care Regulatory     HPI:  Marilu Starr  is a 89 year old (8/9/1928), who is being seen today for an E-REG visit.  HPI information obtained from: facility chart records, facility staff, patient report and [facility] chart review.     Marilu was admitted to LT in 2015. Does have frailty, OA, dementia, Parkinsonism and sleep hygiene impairment. She is wheelchair bound and dependent in ADLs. She is seen in her room today sitting up in her wheelchair. She is soft spoken per usual and is having difficulty understanding the concept of the physician telehealth visit d/t her dementia. Says she feels \"ok - I feel good today\". Says she is eating ok and denies shortness of breath or bowel/bladder concerns. Was treated for UTI in January when supposedly she had some concerning thoughts.  H/o some left shoulder arthritis pain and had injection Dec 2019. Today when asked she denies left shoulder pain. Continues to c/o sleep problems - \"I don't sleep the best\" but she is unable to expand upon that. Doesn't know how long she sleeps but doesn't think she naps " during the day.    I spoke with her nurse separately who says she is not drooling but sometimes is spitting out meds. She thinks the [facility] is giving them in apple sauce but she will check this as well as see if there is a certain time of day where she is more prone to do so. No other behaviors and she is usually very calm. Her weight is slightly down this month to 108 from 115-117 range.    Case Management and Team Discussion:  I called and spoke with Nursing staff at the facility regarding the current Plan of Care. I have reviewed the facility/SNF care plan/MDS, including the falls risk, nutrition and pain screening. I also reviewed the current immunizations, and preventive care.Patient's desire to return to the community is not assessible due to cognitive impairment. Current Level of Care is appropriate at this time. The Plan of Care is appropriate at this time.     Advance Directive Discussion:    I reviewed the current advanced directives as reflected in EPIC, the POLST and the facility chart, and verified the congruency of orders. I did not due to cognitive impairment review the advance directives with the resident.       Past Medical and Surgical History reviewed in Epic today.    MEDICATIONS: Reviewed in [facility]  Current Outpatient Medications   Medication Sig Dispense Refill     acetaminophen (TYLENOL) 325 MG tablet Take 3 tablets (975 mg) by mouth 3 times daily And 650 mg daily PRN       amoxicillin (AMOXIL) 500 MG capsule Take 4 capsules (2,000 mg) by mouth once as needed (prior to dental procedure, as directed by DENTAL staff)       clopidogrel (PLAVIX) 75 MG tablet Take 75 mg by mouth daily       erythromycin (ROMYCIN) 5 MG/GM ophthalmic ointment Place 0.25 inches into both eyes At Bedtime       Menthol-Methyl Salicylate (ICY HOT EXTRA STRENGTH) 10-30 % CREA Externally apply topically 3 times daily       mirtazapine (REMERON) 15 MG tablet Take 15 mg by mouth At Bedtime       polyethylene glycol  "(MIRALAX/GLYCOLAX) packet Take 17 g by mouth every other day       Vitamin D3 (CHOLECALCIFEROL) 25 mcg (1000 units) tablet Take 1 tablet (25 mcg) by mouth daily       REVIEW OF SYSTEMS: Limited secondary to cognitive impairment but today pt reports as above in HPI    Objective: BP 98/62   Pulse 67   Temp 98.3  F (36.8  C)   Resp 18   Ht 1.473 m (4' 10\")   Wt 49.2 kg (108 lb 6.4 oz)   SpO2 96%   BMI 22.66 kg/m    Limited visit exam done given COVID-19 precautions.   Calm, pleasant, nicely dressed sitting in wheelchair  Parkinsonian features noted, no drooling  Flat affect with hypophonic speech    Labs:   No recent labs    ASSESSMENT/PLAN:  Marilu Starr is a 89 yoF with h/o frailty, OA, dementia, Parkinsonism and sleep hygiene impairment seen for regulatory visit today. She has lost some weight this past month; will have to monitor trend. All residents are eating in their rooms d/t COVID-19 precautions so hopefully she is getting enough oral intake. Now sometimes spitting out meds but nurse will get [facility] and try to work with her on this and if still struggling to let us know. Continue current medications and nursing cares. She was able to be successfully tapered off of Clonazepam and remains on Remeron.    Electronically signed by:  Rajwinder Harvey DO     Video-Visit Details  Type of service:  Video Visit  Video End Time (time video stopped): 12:05 PM  Distant Location (provider location):  [place] [facility]         "

## 2020-04-23 NOTE — LETTER
"    4/23/2020        RE: Marilu Starr  [facility]  [address] [address]        [place] [facility]  REGULATORY  Marilu Starr is being evaluated via a billable video visit due to the restrictions of the Covid-19 pandemic.   The patient has been notified of following:\"This video visit will be conducted via a call between you and your provider. We have found that certain health care needs can be provided without the need for an in-person physical exam.  This service lets us provide the care you need with a video conversation. If during the course of the call the provider feels a video visit is not appropriate, you will not be charged for this service.\"   The provider has received verbal consent for a Video Visit from the patient or first contact? Yes    Video Start Time: 12:00 PM    [place] Medical Record Number:  [id number]  Place of Location at the time of visit: [facility]   Chief Complaint   Patient presents with     group home Regulatory     HPI:  Marilu Starr  is a 89 year old (8/9/1928), who is being seen today for an E-REG visit.  HPI information obtained from: facility chart records, facility staff, patient report and [facility] chart review.     Marilu was admitted to LTC in 2015. Does have frailty, OA, dementia, Parkinsonism and sleep hygiene impairment. She is wheelchair bound and dependent in ADLs. She is seen in her room today sitting up in her wheelchair. She is soft spoken per usual and is having difficulty understanding the concept of the physician telehealth visit d/t her dementia. Says she feels \"ok - I feel good today\". Says she is eating ok and denies shortness of breath or bowel/bladder concerns. Was treated for UTI in January when supposedly she had some concerning thoughts.  H/o some left shoulder arthritis pain and had injection Dec 2019. Today when asked she denies left shoulder pain. Continues to c/o sleep problems - \"I don't sleep the best\" " but she is unable to expand upon that. Doesn't know how long she sleeps but doesn't think she naps during the day.    I spoke with her nurse separately who says she is not drooling but sometimes is spitting out meds. She thinks the [facility] is giving them in apple sauce but she will check this as well as see if there is a certain time of day where she is more prone to do so. No other behaviors and she is usually very calm. Her weight is slightly down this month to 108 from 115-117 range.    Case Management and Team Discussion:  I called and spoke with Nursing staff at the facility regarding the current Plan of Care. I have reviewed the facility/SNF care plan/MDS, including the falls risk, nutrition and pain screening. I also reviewed the current immunizations, and preventive care.Patient's desire to return to the community is not assessible due to cognitive impairment. Current Level of Care is appropriate at this time. The Plan of Care is appropriate at this time.     Advance Directive Discussion:    I reviewed the current advanced directives as reflected in EPIC, the POLST and the facility chart, and verified the congruency of orders. I did not due to cognitive impairment review the advance directives with the resident.       Past Medical and Surgical History reviewed in Epic today.    MEDICATIONS: Reviewed in [facility]  Current Outpatient Medications   Medication Sig Dispense Refill     acetaminophen (TYLENOL) 325 MG tablet Take 3 tablets (975 mg) by mouth 3 times daily And 650 mg daily PRN       amoxicillin (AMOXIL) 500 MG capsule Take 4 capsules (2,000 mg) by mouth once as needed (prior to dental procedure, as directed by DENTAL staff)       clopidogrel (PLAVIX) 75 MG tablet Take 75 mg by mouth daily       erythromycin (ROMYCIN) 5 MG/GM ophthalmic ointment Place 0.25 inches into both eyes At Bedtime       Menthol-Methyl Salicylate (ICY HOT EXTRA STRENGTH) 10-30 % CREA Externally apply topically 3 times daily        "mirtazapine (REMERON) 15 MG tablet Take 15 mg by mouth At Bedtime       polyethylene glycol (MIRALAX/GLYCOLAX) packet Take 17 g by mouth every other day       Vitamin D3 (CHOLECALCIFEROL) 25 mcg (1000 units) tablet Take 1 tablet (25 mcg) by mouth daily       REVIEW OF SYSTEMS: Limited secondary to cognitive impairment but today pt reports as above in HPI    Objective: BP 98/62   Pulse 67   Temp 98.3  F (36.8  C)   Resp 18   Ht 1.473 m (4' 10\")   Wt 49.2 kg (108 lb 6.4 oz)   SpO2 96%   BMI 22.66 kg/m    Limited visit exam done given COVID-19 precautions.   Calm, pleasant, nicely dressed sitting in wheelchair  Parkinsonian features noted, no drooling  Flat affect with hypophonic speech    Labs:   No recent labs    ASSESSMENT/PLAN:  Marilu Starr is a 89 yoF with h/o frailty, OA, dementia, Parkinsonism and sleep hygiene impairment seen for regulatory visit today. She has lost some weight this past month; will have to monitor trend. All residents are eating in their rooms d/t COVID-19 precautions so hopefully she is getting enough oral intake. Now sometimes spitting out meds but nurse will get more specifics from [facility] and try to work with her on this and if still struggling to let us know. Continue current medications and nursing cares. She was able to be successfully tapered off of Clonazepam and remains on Remeron.    Electronically signed by:  Rajwinder Harvey DO     Video-Visit Details  Type of service:  Video Visit  Video End Time (time video stopped): 12:05 PM  Distant Location (provider location):  [place] [facility]             Sincerely,        Rajwinder Harvey DO    "

## 2020-04-23 NOTE — Clinical Note
ThaisMarilu is down ~8 pounds this month. ?Wonder if r/t eating in her room and isolation vs worsening dementia. Staff say sometimes she is spitting out her pills in refusal. RN was going to ask T[facility] for more information on the frequency to the spitting out of medications. Does dietary automatically get involved or do we need to alert them?

## 2020-05-04 NOTE — PROGRESS NOTES
"[facility]  Type of service: Video Visit  Marilu Starr is being evaluated via a billable visit due to the restrictions of the Covid-19 pandemic.   The patient or first contact has been notified of following:  \"This video visit will be conducted via a call between you and your provider. We have found that certain health care needs can be provided without the need for an in-person physical exam.  This service lets us provide the care you need with a video conversation. If during the course of the call the provider feels a video visit is not appropriate, you will not be charged for this service.\"   The provider has received verbal consent for a Video or Telephone Visit from the patient and or first contact? Yes  Video or Telephone Start Time: 1120  [place] Medical Record Number:  [id number]  Where the Patient is living now: [facility]   Chief Complaint   Patient presents with     Annual Comprehensive Nursing Home     HPI:    Marilu Starr  is a 89 year old  (8/9/1928), who is being seen today for an annual comprehensive visit. HPI information obtained from: facility chart records, facility staff, patient report and [facility] chart review.  Today's concerns are:  Parkinson's syndrome (H)  Taken off Sinemet by Neurologist  Moved to LTC SNF 2015  Uses W/C for mobility  Noted cognitive decline (see below for scoring)  Is on mechanical soft diet, no drooling noted during today's visit  Has a flat affect  Uses w/c for mobility, has slow motor movements.      History of CVA (cerebrovascular accident) - 3/2005  Noted cognitive impairment post CVA  Uses w/c for mobility (unclear if after CVA or progressive decline and Parkinson's symptoms which contributed more)  On Plavix 75 mg daily for ppx, no HDL d/t goals of care    Dementia without behavioral disturbance, unspecified dementia type (H)  Depression with anxiety  Insomnia secondary to depression with anxiety  SLUMS 12/22/15 - 7/30  BIMS " "recently 6  PHQ9 recently 5  On mirtazapine 15 mg q HS - see below for weight loss as well    Resides in SNF for increased care needs  Able to make her needs known but unlikely she asks for much as she is quite content and demands quite little.     Patient reports not sleeping well but reports this has been chronic (may be poor historian).  She reports having a good appetite.  The nurses report she is the \"ring queen\" as she enjoys ring quite a bit.     Weight loss  On mechanical soft diet, mirtazapine 15 mg q HS and Juice-based supplement TID  Weights:  1/7/20 - 116  2/4/20 - 115  3/3/20 - 117.8  4/14/20 - 108.3  4/17/20 - 108.4    Primary osteoarthritis involving multiple joints  Chronic left shoulder pain  Bilateral chronic knee pain  S/p left TKO with persistent pain  Analgesia with Tylenol 975 mg TID and 650 mg daily PRN, IcyHot to shoulder and neck TID  Has had steroid injections in the past to her shoulders which may have helped - last injection 12/5/19    Patient reports no pain today (may be poor historian)     Irritable bowel syndrome, unspecified type  Slow transit constipation  On Miralax 17 gm every other day for constipation  SNF EHR showing BM about q 3 days.     Patient denies constipation today    Retinal vascular occlusion of left eye  F/b Ophthalmology, Dr Rasheed  On erythromycin ointment q HS    Frailty  Advanced age and non-ambulatory  Required nursing for increased care needs    Vitamin D deficiency  On vitamin D 1000 units daily.  5/30/19 - mateo min D level:  14 (med started after this)         ALLERGIES: Codeine sulfate [codeine]; Demerol [meperidine]; and Morphine sulfate [morphine]  PAST MEDICAL HISTORY:  has a past medical history of CVD (cerebrovascular disease), Dementia (H), Insomnia, Parkinsonism (H), and Restless legs syndrome (RLS).  PAST SURGICAL HISTORY:  has a past surgical history that includes Right knee arthroplasty and Left shoulder arthroscopy " (Left).  IMMUNIZATIONS:  Immunization History   Administered Date(s) Administered     Influenza (High Dose) 3 valent vaccine 09/29/2017     Influenza (IIV3) PF 10/18/2019     Pneumococcal 23 valent 08/14/2007     TDAP Vaccine (Adacel) 10/15/2019     Zoster vaccine, live 08/14/2007     Above immunizations pulled from [facility]. MIIC and facility records also reconciled. Outstanding information sent to  to update [facility].  Future immunizations are not needed at this point as all recommended immunizations are up to date.     Current Outpatient Medications   Medication Sig Dispense Refill     mirtazapine (REMERON) 15 MG tablet Take 2 tablets (30 mg) by mouth At Bedtime       acetaminophen (TYLENOL) 325 MG tablet Take 3 tablets (975 mg) by mouth 3 times daily And 650 mg daily PRN       amoxicillin (AMOXIL) 500 MG capsule Take 4 capsules (2,000 mg) by mouth once as needed (prior to dental procedure, as directed by DENTAL staff)       clopidogrel (PLAVIX) 75 MG tablet Take 75 mg by mouth daily       erythromycin (ROMYCIN) 5 MG/GM ophthalmic ointment Place 0.25 inches into both eyes At Bedtime       Menthol-Methyl Salicylate (ICY HOT EXTRA STRENGTH) 10-30 % CREA Externally apply topically 3 times daily       polyethylene glycol (MIRALAX/GLYCOLAX) packet Take 17 g by mouth every other day       Vitamin D3 (CHOLECALCIFEROL) 25 mcg (1000 units) tablet Take 1 tablet (25 mcg) by mouth daily         Case Management:  I have reviewed the facility/SNF care plan/MDS, including the falls risk, nutrition and pain screening. I also reviewed the current immunizations, and preventive care. .Future cancer screening is not clinically indicated secondary to age/goals of care Patient's desire to return to the community is not assessible due to cognitive impairment. Current Level of Care is appropriate.    Advance Directive Discussion:    I reviewed the current advanced directives as reflected in EPIC, the POLST and  "the facility chart, and verified the congruency of orders.   Team Discussion:  I communicated with the appropriate disciplines involved with the Plan of Care:   Nursing    Patient's goal is pain control and comfort and family's/responsible party's goal for the patient is pain control and comfort.  Information reviewed:  Medications, vital signs, orders, and nursing notes.    ROS:  Limited secondary to cognitive impairment but today pt reports 10 point ROS of systems including Constitutional, Eyes, Respiratory, Cardiovascular, Gastroenterology, Genitourinary, Integumentary, Musculoskeletal, Psychiatric were all negative except for pertinent positives noted in my HPI.    Vitals:  BP 98/62   Pulse 67   Temp 97.4  F (36.3  C)   Resp 18   Ht 1.473 m (4' 10\")   Wt 49.2 kg (108 lb 6.4 oz)   SpO2 96%   BMI 22.66 kg/m   Body mass index is 22.66 kg/m .  Exam:   GENERAL APPEARANCE:  Alert, in no distress, frail, pleasant, elderly woman, [place]  RESP:  respiratory effort normal, no respiratory distress, on RA  CV:  No LE peripheral edema  ABDOMEN:  nondistended  M/S:   Gait and station with W/C for mobility, has kyphotic curvature to thoracic spine, Digits and nails with arthritic changes, reduced muscle mass  SKIN:  Inspection and Palpation of skin and subcutaneous tissue pale, dry, seemingly intact  PSYCH:  insight and judgement, memory with impairment, affect flat and mood normal, follows commands with repeated request.       Lab/Diagnostic data:   Labs done while at Skilled Nursing Facility done by [place] [facility] lab. Pertinent results are: reviewed    ASSESSMENT/PLAN  Parkinson's syndrome (H)  Stable without need for medication  Has nursing for increased care needs and supportive cares.   Likely progressive in nature but will follow clinically     History of CVA (cerebrovascular accident)  No statin d/t goals of care and advanced age  Continue Plavix at this time. Can check Hgb for stability.   Nursing for increased " care needs.     Dementia without behavioral disturbance, unspecified dementia type (H)  Depression with anxiety  Insomnia secondary to depression with anxiety  D/t weight loss and insomnia - can try increasing mirtazapine to 30 mg daily and see if this is effective for either concern.   Nursing for supportive cares    Weight loss  Is already on supplements and still losing weight.  General decline possible but could try increasing mirtazapine to 30 mg daily and monitor for results.     Primary osteoarthritis involving multiple joints  Chronic left shoulder pain  Bilateral chronic knee pain  Stable on current regimen.    Patient would be available for steroid injection should she have pain, but as unable to enter facility at this time d/t COVID-19 pandemic, will keep regimen and may wish to GDR Tylenol in future when able to see patient for injections.      Irritable bowel syndrome, unspecified type  Slow transit constipation  Patient may benefit from increase in Miralax but will monitor more as possible charting is missing     Retinal vascular occlusion of left eye  Stable  Continue regimen    Frailty  Noted decline, advanced age, and more frail than in previous visits.  Resides in SNF for increased care needs  May wish to ask family if interested in hospice if weight loss continues despite increased dose of mirtazapine.     Vitamin D deficiency  Continue regimen. Check level.     Orders written by provider at facility  1. Increase Mirtazapine to 30 mg po q HS. Dx: weight loss, insomnia, dementia, depression   2. 5/7/20 Labs: VIt D level, BMP, Hgb     Electronically signed by:  LYNDSAY Cotton CNP     Visit Details  Video or Telephone End Time: 1129  Distant Location (provider location):  [place] [facility]

## 2020-05-05 NOTE — LETTER
"    5/5/2020        RE: Marilu Starr  [facility]  [address] [address]        [facility]  Type of service: Video Visit  Marilu Starr is being evaluated via a billable visit due to the restrictions of the Covid-19 pandemic.   The patient or first contact has been notified of following:  \"This video visit will be conducted via a call between you and your provider. We have found that certain health care needs can be provided without the need for an in-person physical exam.  This service lets us provide the care you need with a video conversation. If during the course of the call the provider feels a video visit is not appropriate, you will not be charged for this service.\"   The provider has received verbal consent for a Video or Telephone Visit from the patient and or first contact? Yes  Video or Telephone Start Time: 1120  [place] Medical Record Number:  [id number]  Where the Patient is living now: [facility]   Chief Complaint   Patient presents with     Annual Comprehensive Nursing Home     HPI:    Marilu Starr  is a 89 year old  (8/9/1928), who is being seen today for an annual comprehensive visit. HPI information obtained from: facility chart records, facility staff, patient report and [facility] chart review.  Today's concerns are:  Parkinson's syndrome (H)  Taken off Sinemet by Neurologist  Moved to LTC SNF 2015  Uses W/C for mobility  Noted cognitive decline (see below for scoring)  Is on mechanical soft diet, no drooling noted during today's visit  Has a flat affect  Uses w/c for mobility, has slow motor movements.      History of CVA (cerebrovascular accident) - 3/2005  Noted cognitive impairment post CVA  Uses w/c for mobility (unclear if after CVA or progressive decline and Parkinson's symptoms which contributed more)  On Plavix 75 mg daily for ppx, no HDL d/t goals of care    Dementia without behavioral disturbance, unspecified dementia type " "(H)  Depression with anxiety  Insomnia secondary to depression with anxiety  SLUMS 12/22/15 - 7/30  BIMS recently 6  PHQ9 recently 5  On mirtazapine 15 mg q HS - see below for weight loss as well    Resides in SNF for increased care needs  Able to make her needs known but unlikely she asks for much as she is quite content and demands quite little.     Patient reports not sleeping well but reports this has been chronic (may be poor historian).  She reports having a good appetite.  The nurses report she is the \"ring queen\" as she enjoys ring quite a bit.     Weight loss  On mechanical soft diet, mirtazapine 15 mg q HS and Juice-based supplement TID  Weights:  1/7/20 - 116  2/4/20 - 115  3/3/20 - 117.8  4/14/20 - 108.3  4/17/20 - 108.4    Primary osteoarthritis involving multiple joints  Chronic left shoulder pain  Bilateral chronic knee pain  S/p left TKO with persistent pain  Analgesia with Tylenol 975 mg TID and 650 mg daily PRN, IcyHot to shoulder and neck TID  Has had steroid injections in the past to her shoulders which may have helped - last injection 12/5/19    Patient reports no pain today (may be poor historian)     Irritable bowel syndrome, unspecified type  Slow transit constipation  On Miralax 17 gm every other day for constipation  SNF EHR showing BM about q 3 days.     Patient denies constipation today    Retinal vascular occlusion of left eye  F/b Ophthalmology, Dr Rasheed  On erythromycin ointment q HS    Frailty  Advanced age and non-ambulatory  Required nursing for increased care needs    Vitamin D deficiency  On vitamin D 1000 units daily.  5/30/19 - mateo min D level:  14 (med started after this)         ALLERGIES: Codeine sulfate [codeine]; Demerol [meperidine]; and Morphine sulfate [morphine]  PAST MEDICAL HISTORY:  has a past medical history of CVD (cerebrovascular disease), Dementia (H), Insomnia, Parkinsonism (H), and Restless legs syndrome (RLS).  PAST SURGICAL HISTORY:  has a past surgical " history that includes Right knee arthroplasty and Left shoulder arthroscopy (Left).  IMMUNIZATIONS:  Immunization History   Administered Date(s) Administered     Influenza (High Dose) 3 valent vaccine 09/29/2017     Influenza (IIV3) PF 10/18/2019     Pneumococcal 23 valent 08/14/2007     TDAP Vaccine (Adacel) 10/15/2019     Zoster vaccine, live 08/14/2007     Above immunizations pulled from [facility]. MIIC and facility records also reconciled. Outstanding information sent to  to update [facility].  Future immunizations are not needed at this point as all recommended immunizations are up to date.     Current Outpatient Medications   Medication Sig Dispense Refill     mirtazapine (REMERON) 15 MG tablet Take 2 tablets (30 mg) by mouth At Bedtime       acetaminophen (TYLENOL) 325 MG tablet Take 3 tablets (975 mg) by mouth 3 times daily And 650 mg daily PRN       amoxicillin (AMOXIL) 500 MG capsule Take 4 capsules (2,000 mg) by mouth once as needed (prior to dental procedure, as directed by DENTAL staff)       clopidogrel (PLAVIX) 75 MG tablet Take 75 mg by mouth daily       erythromycin (ROMYCIN) 5 MG/GM ophthalmic ointment Place 0.25 inches into both eyes At Bedtime       Menthol-Methyl Salicylate (ICY HOT EXTRA STRENGTH) 10-30 % CREA Externally apply topically 3 times daily       polyethylene glycol (MIRALAX/GLYCOLAX) packet Take 17 g by mouth every other day       Vitamin D3 (CHOLECALCIFEROL) 25 mcg (1000 units) tablet Take 1 tablet (25 mcg) by mouth daily         Case Management:  I have reviewed the facility/SNF care plan/MDS, including the falls risk, nutrition and pain screening. I also reviewed the current immunizations, and preventive care. .Future cancer screening is not clinically indicated secondary to age/goals of care Patient's desire to return to the community is not assessible due to cognitive impairment. Current Level of Care is appropriate.    Advance Directive Discussion:    I  "reviewed the current advanced directives as reflected in EPIC, the POLST and the facility chart, and verified the congruency of orders.   Team Discussion:  I communicated with the appropriate disciplines involved with the Plan of Care:   Nursing    Patient's goal is pain control and comfort and family's/responsible party's goal for the patient is pain control and comfort.  Information reviewed:  Medications, vital signs, orders, and nursing notes.    ROS:  Limited secondary to cognitive impairment but today pt reports 10 point ROS of systems including Constitutional, Eyes, Respiratory, Cardiovascular, Gastroenterology, Genitourinary, Integumentary, Musculoskeletal, Psychiatric were all negative except for pertinent positives noted in my HPI.    Vitals:  BP 98/62   Pulse 67   Temp 97.4  F (36.3  C)   Resp 18   Ht 1.473 m (4' 10\")   Wt 49.2 kg (108 lb 6.4 oz)   SpO2 96%   BMI 22.66 kg/m   Body mass index is 22.66 kg/m .  Exam:   GENERAL APPEARANCE:  Alert, in no distress, frail, pleasant, elderly woman, [place]  RESP:  respiratory effort normal, no respiratory distress, on RA  CV:  No LE peripheral edema  ABDOMEN:  nondistended  M/S:   Gait and station with W/C for mobility, has kyphotic curvature to thoracic spine, Digits and nails with arthritic changes, reduced muscle mass  SKIN:  Inspection and Palpation of skin and subcutaneous tissue pale, dry, seemingly intact  PSYCH:  insight and judgement, memory with impairment, affect flat and mood normal, follows commands with repeated request.       Lab/Diagnostic data:   Labs done while at Skilled Nursing Facility done by [facility] lab. Pertinent results are: reviewed    ASSESSMENT/PLAN  Parkinson's syndrome (H)  Stable without need for medication  Has nursing for increased care needs and supportive cares.   Likely progressive in nature but will follow clinically     History of CVA (cerebrovascular accident)  No statin d/t goals of care and advanced age  Continue " Plavix at this time. Can check Hgb for stability.   Nursing for increased care needs.     Dementia without behavioral disturbance, unspecified dementia type (H)  Depression with anxiety  Insomnia secondary to depression with anxiety  D/t weight loss and insomnia - can try increasing mirtazapine to 30 mg daily and see if this is effective for either concern.   Nursing for supportive cares    Weight loss  Is already on supplements and still losing weight.  General decline possible but could try increasing mirtazapine to 30 mg daily and monitor for results.     Primary osteoarthritis involving multiple joints  Chronic left shoulder pain  Bilateral chronic knee pain  Stable on current regimen.    Patient would be available for steroid injection should she have pain, but as unable to enter facility at this time d/t COVID-19 pandemic, will keep regimen and may wish to GDR Tylenol in future when able to see patient for injections.      Irritable bowel syndrome, unspecified type  Slow transit constipation  Patient may benefit from increase in Miralax but will monitor more as possible charting is missing     Retinal vascular occlusion of left eye  Stable  Continue regimen    Frailty  Noted decline, advanced age, and more frail than in previous visits.  Resides in SNF for increased care needs  May wish to ask family if interested in hospice if weight loss continues despite increased dose of mirtazapine.     Vitamin D deficiency  Continue regimen. Check level.     Orders written by provider at facility  1. Increase Mirtazapine to 30 mg po q HS. Dx: weight loss, insomnia, dementia, depression   2. 5/7/20 Labs: VIt D level, BMP, Hgb     Electronically signed by:  LYNDSAY Cotton CNP     Visit Details  Video or Telephone End Time: 1129  Distant Location (provider location):  [place] [facility]         Sincerely,        LYNDSAY Cotton CNP

## 2020-07-16 NOTE — LETTER
7/16/2020        RE: Marilu Starr  [facility]  [address] [address]        [place] [facility]  PHYSICIAN NOTE    Chief Complaint   Patient presents with     detention Regulatory       HPI:    Marilu Starr is a 89 year old  (8/9/1928), who is being seen today for a federally mandated E/M visit at [facility] .       Marilu was admitted to LT in 2015. Does have frailty, OA, dementia, h/o CVA on Plavix, Parkinsonism and sleep hygiene impairment. She is wheelchair bound and dependent in ADLs. She is seen in her room today sitting up in her wheelchair. Aide just helped her out of bathroom and she had a BM; reporting regular BMs per staff. Marilu denies pain or care needs; denies dyspnea or cough. She knows about her 3 adult children but asks me about my parents and where they live. Others at her facility have tested positive for COVID-19 but she tested negative. Weight down this springtime from 115# in Feb to 106# in May/June but now back up to 108# this month. Her Remeron was increased in May from 15 mg to 30 mg. No nursing concerns. Recent stable labs in May as well.      ALLERGIES: Codeine sulfate [codeine]; Demerol [meperidine]; and Morphine sulfate [morphine]    Past Medical History:   Diagnosis Date     CVD (cerebrovascular disease)      Dementia (H)      Insomnia      Parkinsonism (H)      Restless legs syndrome (RLS)       Past Surgical History:   Procedure Laterality Date     Left shoulder arthroscopy Left      Right knee arthroplasty       CODE STATUS: DNR/I    MEDICATIONS: Reviewed in [facility]  Current Outpatient Medications   Medication Sig Dispense Refill     acetaminophen (TYLENOL) 325 MG tablet Take 3 tablets (975 mg) by mouth 3 times daily And 650 mg daily PRN       amoxicillin (AMOXIL) 500 MG capsule Take 4 capsules (2,000 mg) by mouth once as needed (prior to dental procedure, as directed by DENTAL staff)       clopidogrel (PLAVIX) 75 MG  "tablet Take 75 mg by mouth daily       erythromycin (ROMYCIN) 5 MG/GM ophthalmic ointment Place 0.25 inches into both eyes At Bedtime       Menthol-Methyl Salicylate (ICY HOT EXTRA STRENGTH) 10-30 % CREA Externally apply topically 3 times daily       mirtazapine (REMERON) 15 MG tablet Take 2 tablets (30 mg) by mouth At Bedtime       polyethylene glycol (MIRALAX/GLYCOLAX) packet Take 17 g by mouth every other day       Vitamin D3 (CHOLECALCIFEROL) 25 mcg (1000 units) tablet Take 1 tablet (25 mcg) by mouth daily         ROS:  Limited secondary to cognitive impairment but today pt reports as above in HPI    Exam:  /68   Pulse 67   Temp 98.2  F (36.8  C)   Resp 18   Ht 1.473 m (4' 10\")   Wt 49.1 kg (108 lb 4.8 oz)   SpO2 92%   BMI 22.63 kg/m    Alert, sitting up in wheelchair in NAD, casually dressed, has TV on in her room with Spiritism service  Bilateral hearing aides in place  No drooling noted  Breathing non-labored, no cough  Flexed and kyphotic posture, no tremor, hypophonic speech  Wearing bilateral shin guards      Lab/Diagnostic Data:    5/7/20: Hgb 12.0, Vit D 34, Na 140, K 3.9, BUN 22, Cr 0.58    ASSESSMENT/PLAN:  Marilu Starr is a 89 yoF with h/o frailty, OA, dementia, Parkinsonism, h/o CVA on Plavix and sleep hygiene impairment seen for regulatory visit today.  Weight up a bit from a couple months ago but overall trend is down slightly; Remeron may have helped. She denies concerns today and nursing staff voice no concerns either. Benefiting from the support of LTC team. No changes made today to her care plan.        Electronically signed by:  Rajwinder Harvey DO        Sincerely,        Rajwinder Harvey DO    "

## 2020-07-17 NOTE — PROGRESS NOTES
[place] [facility]  PHYSICIAN NOTE    Chief Complaint   Patient presents with     half-way Regulatory       HPI:    Marilu Starr is a 89 year old  (8/9/1928), who is being seen today for a federally mandated E/M visit at [facility] .       Marilu was admitted to LT in 2015. Does have frailty, OA, dementia, h/o CVA on Plavix, Parkinsonism and sleep hygiene impairment. She is wheelchair bound and dependent in ADLs. She is seen in her room today sitting up in her wheelchair. Aide just helped her out of bathroom and she had a BM; reporting regular BMs per staff. Marilu denies pain or care needs; denies dyspnea or cough. She knows about her 3 adult children but asks me about my parents and where they live. Others at her facility have tested positive for COVID-19 but she tested negative. Weight down this springtime from 115# in Feb to 106# in May/June but now back up to 108# this month. Her Remeron was increased in May from 15 mg to 30 mg. No nursing concerns. Recent stable labs in May as well.      ALLERGIES: Codeine sulfate [codeine]; Demerol [meperidine]; and Morphine sulfate [morphine]    Past Medical History:   Diagnosis Date     CVD (cerebrovascular disease)      Dementia (H)      Insomnia      Parkinsonism (H)      Restless legs syndrome (RLS)       Past Surgical History:   Procedure Laterality Date     Left shoulder arthroscopy Left      Right knee arthroplasty       CODE STATUS: DNR/I    MEDICATIONS: [facility] in [facility]  Current Outpatient Medications   Medication Sig Dispense Refill     acetaminophen (TYLENOL) 325 MG tablet Take 3 tablets (975 mg) by mouth 3 times daily And 650 mg daily PRN       amoxicillin (AMOXIL) 500 MG capsule Take 4 capsules (2,000 mg) by mouth once as needed (prior to dental procedure, as directed by DENTAL staff)       clopidogrel (PLAVIX) 75 MG tablet Take 75 mg by mouth daily       erythromycin (ROMYCIN) 5 MG/GM ophthalmic ointment Place 0.25 inches  "into both eyes At Bedtime       Menthol-Methyl Salicylate (ICY HOT EXTRA STRENGTH) 10-30 % CREA Externally apply topically 3 times daily       mirtazapine (REMERON) 15 MG tablet Take 2 tablets (30 mg) by mouth At Bedtime       polyethylene glycol (MIRALAX/GLYCOLAX) packet Take 17 g by mouth every other day       Vitamin D3 (CHOLECALCIFEROL) 25 mcg (1000 units) tablet Take 1 tablet (25 mcg) by mouth daily         ROS:  Limited secondary to cognitive impairment but today pt reports as above in HPI    Exam:  /68   Pulse 67   Temp 98.2  F (36.8  C)   Resp 18   Ht 1.473 m (4' 10\")   Wt 49.1 kg (108 lb 4.8 oz)   SpO2 92%   BMI 22.63 kg/m    Alert, sitting up in wheelchair in NAD, casually dressed, has TV on in her room with [employer] service  Bilateral hearing aides in place  No drooling noted  Breathing non-labored, no cough  Flexed and kyphotic posture, no tremor, hypophonic speech  Wearing bilateral shin guards      Lab/Diagnostic Data:    5/7/20: Hgb 12.0, Vit D 34, Na 140, K 3.9, BUN 22, Cr 0.58    ASSESSMENT/PLAN:  Marilu Starr is a 89 yoF with h/o frailty, OA, dementia, Parkinsonism, h/o CVA on Plavix and sleep hygiene impairment seen for regulatory visit today.  Weight up a bit from a couple months ago but overall trend is down slightly; Remeron may have helped. She denies concerns today and nursing staff voice no concerns either. Benefiting from the support of LTC team. No changes made today to her care plan.        Electronically signed by:  Rajwinder Harvey DO    "

## 2020-09-09 NOTE — PROGRESS NOTES
"[facility]  Chief Complaint   Patient presents with     jail Regulatory     [place] Medical Record Number:  [id number]  Place of Service where encounter took place:  [facility] ([facility]) [[id number]]    HPI:    Marilu Starr  is 89 year old (8/9/1928), who is being seen today for a federally mandated E/M visit.  HPI information obtained from: facility chart records, facility staff, patient report and [facility] chart review. 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  She has PMH including dementia, parkinsonism features, OA, History CVA on Plavix, vitamin D deficiency depression, GERD, HLD, osteoporosis, frailty with impaired mobility and ADLs which require SNF residence for increased care needs.      Recent Medication Changes:  - 5/5/20: Mirtazapine increased to 30 mg daily for weight loss, depression, etc.   - 8/26/20: Juice-based supplement 6 oz BID started for weight loss    Patient is met today for a regulatory visit. She is in her room in her w/c, asleep. She wakes easily and reports she has no new changes.  She reports she has no pain but when I ask specifically about her shoulders, she says, \"oh, that's always there\" and explains that for the most part it is tolerable.  She has more drooling than in previous visits, near constant now.  She has a very stooped kyphotic posture and mask-like blank facial expression. She is very pleasant and reports she has no SOB, CP, HA, lightheadedness< heartburn, upset stomach, constipation (reporting she just had a BM before I came).    Nursing has no concerns.  "     ALLERGIES:Codeine sulfate [codeine]; Demerol [meperidine]; and Morphine sulfate [morphine]  PAST MEDICAL HISTORY:   has a past medical history of CVD (cerebrovascular disease), Dementia (H), Insomnia, Parkinsonism (H), and Restless legs syndrome (RLS).  PAST SURGICAL HISTORY:   has a past surgical history that includes Right knee arthroplasty and Left shoulder arthroscopy (Left).  FAMILY HISTORY: family history is not on file.  SOCIAL HISTORY:  reports that she has never smoked. She has never used smokeless tobacco. She reports previous alcohol use.    MEDICATIONS:  Current Outpatient Medications   Medication Sig Dispense Refill     acetaminophen (TYLENOL) 325 MG tablet Take 3 tablets (975 mg) by mouth 3 times daily And 650 mg daily PRN       amoxicillin (AMOXIL) 500 MG capsule Take 4 capsules (2,000 mg) by mouth once as needed (prior to dental procedure, as directed by DENTAL staff)       clopidogrel (PLAVIX) 75 MG tablet Take 75 mg by mouth daily       erythromycin (ROMYCIN) 5 MG/GM ophthalmic ointment Place 0.25 inches into both eyes At Bedtime       Menthol-Methyl Salicylate (ICY HOT EXTRA STRENGTH) 10-30 % CREA Externally apply topically 3 times daily       mirtazapine (REMERON) 15 MG tablet Take 2 tablets (30 mg) by mouth At Bedtime       polyethylene glycol (MIRALAX/GLYCOLAX) packet Take 17 g by mouth every other day       Vitamin D3 (CHOLECALCIFEROL) 25 mcg (1000 units) tablet Take 1 tablet (25 mcg) by mouth daily     Case Management:  I have reviewed the care plan and MDS and do agree with the plan. Patient's desire to return to the community is not present. Information reviewed:  Medications, vital signs, orders, and nursing notes.    ROS:  Limited secondary to cognitive impairment but today pt reports 10 point ROS of systems including Constitutional, Eyes, Respiratory, Cardiovascular, Gastroenterology, Genitourinary, Integumentary, Musculoskeletal, Psychiatric were all negative except for pertinent  positives noted in my HPI.    Vitals:  /62   Pulse 66   Temp 97.4  F (36.3  C)   Resp 16   Wt 48.2 kg (106 lb 3.2 oz)   SpO2 97%   BMI 22.20 kg/m    Body mass index is 22.2 kg/m .  Exam:  GENERAL APPEARANCE:  Alert, in no distress  RESP:  respiratory effort and palpation of chest normal, auscultation of lungs clear but diminished, no respiratory distress  CV:  Palpation and auscultation of heart done , rate and rhythm regular, no murmur, no LE peripheral edema  ABDOMEN:  normal bowel sounds, soft, nontender, no hepatosplenomegaly or other masses  M/S:   Gait and station with w/c for mobility, Digits and nails with arthritic changes, significantly reduced muscle mass  SKIN:  Inspection and Palpation of skin and subcutaneous tissue pale, fragile, thin, kaylie sleeves assist with protection   PSYCH:  insight and judgement, memory with impairment, affect and mood normal, follows commands readily     Lab/Diagnostic data:   Labs done while at Skilled Nursing Facility done by [email or web address] lab. Pertinent results are: reviewed    ASSESSMENT/PLAN  Dementia without behavioral disturbance, unspecified dementia type (H)  Depression with anxiety  Insomnia secondary to depression with anxiety  Weight loss  SLUMS 12/22/15 - 7/30  BIMS recently 6  PHQ9 recently 3    On mirtazapine 30 mg q HS for sleep, appetite stimulation, depression.      Resides in SNF for increased care needs  Able to make her needs known but unlikely she asks for much as she is quite content and demands quite little.    Noted weight loss of about 13 lbs in one year and 11 pounds in last 6 months.  9/3/19 - 119.5  3/3/20 - 117.8  9/8/20 - 106.2  Patient is on a regular diet with Juice-based supplements 6 oz BID, which recently were restarted.  Staff feed patient and now her son comes in 2x/week for assistance with feeding her.      PLAN:  - no GDR on Mirtazapine as also being used for appetite stimulation and sleep, depression.  Limited visitors is  adding to depression for many patients.  Her son is now able to come in for 2 days/week but previously visited daily.    - dietary for supplement titration as patient losing weight.   - nursing for increased care needs, appropriate for SNF placement.       Primary osteoarthritis involving multiple joints  Chronic left shoulder pain  Bilateral chronic knee pain  S/p left TKO with persistent pain  Analgesia with Tylenol 975 mg TID and 650 mg daily PRN, IcyHot to shoulder and neck TID  Has had steroid injections in the past to her shoulders which may have helped - last injection 12/5/19    Patient reports chronic pain in left shoulder but reports this as tolerable today.     PLAN:  - Tylenol 975 mg TID  - Tylenol 650 mg daily PRN  - IcyHot topically as ordered  - would be eligible for steroid injections if she wishes.      Slow transit constipation  On Miralax 17 gm po every other day  SNF EHR showing BM q 3-5 days - patient reports no constipation     PLAN:  - monitor closely  - Miralax QOD    Frailty  Impaired mobility and ADLs  Resides in SNF for increased care needs  Noted progressive dementia with Farheen lift for transfers, w/c for mobility  Staff anticipate her needs mostly.  Noted weight loss.     PLAN:  - appropriate for SNF placement d/t increased care needs and progressive frailty.         Electronically signed by:  LYNDSAY Cotton CNP

## 2020-09-10 NOTE — LETTER
"    9/10/2020        RE: Marilu Starr  [facility]  [address] [address]        [place] [facility]  Chief Complaint   Patient presents with     senior living Regulatory     [place] Medical Record Number:  [id number]  Place of Service where encounter took place:  [facility] ([facility]) [[id number]]    HPI:    Marilu Starr  is 89 year old (8/9/1928), who is being seen today for a federally mandated E/M visit.  HPI information obtained from: facility chart records, facility staff, patient report and [facility] chart review. Today's concerns are:     Dementia without behavioral disturbance, unspecified dementia type (H)  Depression with anxiety  Insomnia secondary to depression with anxiety  Weight loss  Primary osteoarthritis involving multiple joints  Chronic left shoulder pain  Bilateral chronic knee pain  Slow transit constipation  Frailty  Impaired mobility and ADLs     Patient is a resident of [facility] since 2015.  She has PMH including dementia, parkinsonism features, OA, History CVA on Plavix, vitamin D deficiency depression, GERD, HLD, osteoporosis, frailty with impaired mobility and ADLs which require SNF residence for increased care needs.      Recent Medication Changes:  - 5/5/20: Mirtazapine increased to 30 mg daily for weight loss, depression, etc.   - 8/26/20: Juice-based supplement 6 oz BID started for weight loss    Patient is met today for a regulatory visit. She is in her room in her w/c, asleep. She wakes easily and reports she has no new changes.  She reports she has no pain but when I ask specifically about her shoulders, she says, \"oh, that's always there\" and explains that for the most part it is tolerable.  She has more drooling than in previous visits, near constant now.  She has a very stooped kyphotic posture and mask-like blank facial expression. She is very pleasant and reports she has no SOB, CP, HA, lightheadedness< heartburn, upset stomach, " constipation (reporting she just had a BM before I came).    Nursing has no concerns.      ALLERGIES:Codeine sulfate [codeine]; Demerol [meperidine]; and Morphine sulfate [morphine]  PAST MEDICAL HISTORY:   has a past medical history of CVD (cerebrovascular disease), Dementia (H), Insomnia, Parkinsonism (H), and Restless legs syndrome (RLS).  PAST SURGICAL HISTORY:   has a past surgical history that includes Right knee arthroplasty and Left shoulder arthroscopy (Left).  FAMILY HISTORY: family history is not on file.  SOCIAL HISTORY:  reports that she has never smoked. She has never used smokeless tobacco. She reports previous alcohol use.    MEDICATIONS:  Current Outpatient Medications   Medication Sig Dispense Refill     acetaminophen (TYLENOL) 325 MG tablet Take 3 tablets (975 mg) by mouth 3 times daily And 650 mg daily PRN       amoxicillin (AMOXIL) 500 MG capsule Take 4 capsules (2,000 mg) by mouth once as needed (prior to dental procedure, as directed by DENTAL staff)       clopidogrel (PLAVIX) 75 MG tablet Take 75 mg by mouth daily       erythromycin (ROMYCIN) 5 MG/GM ophthalmic ointment Place 0.25 inches into both eyes At Bedtime       Menthol-Methyl Salicylate (ICY HOT EXTRA STRENGTH) 10-30 % CREA Externally apply topically 3 times daily       mirtazapine (REMERON) 15 MG tablet Take 2 tablets (30 mg) by mouth At Bedtime       polyethylene glycol (MIRALAX/GLYCOLAX) packet Take 17 g by mouth every other day       Vitamin D3 (CHOLECALCIFEROL) 25 mcg (1000 units) tablet Take 1 tablet (25 mcg) by mouth daily     Case Management:  I have reviewed the care plan and MDS and do agree with the plan. Patient's desire to return to the community is not present. Information reviewed:  Medications, vital signs, orders, and nursing notes.    ROS:  Limited secondary to cognitive impairment but today pt reports 10 point ROS of systems including Constitutional, Eyes, Respiratory, Cardiovascular, Gastroenterology,  Genitourinary, Integumentary, Musculoskeletal, Psychiatric were all negative except for pertinent positives noted in my HPI.    Vitals:  /62   Pulse 66   Temp 97.4  F (36.3  C)   Resp 16   Wt 48.2 kg (106 lb 3.2 oz)   SpO2 97%   BMI 22.20 kg/m    Body mass index is 22.2 kg/m .  Exam:  GENERAL APPEARANCE:  Alert, in no distress  RESP:  respiratory effort and palpation of chest normal, auscultation of lungs clear but diminished, no respiratory distress  CV:  Palpation and auscultation of heart done , rate and rhythm regular, no murmur, no LE peripheral edema  ABDOMEN:  normal bowel sounds, soft, nontender, no hepatosplenomegaly or other masses  M/S:   Gait and station with w/c for mobility, Digits and nails with arthritic changes, significantly reduced muscle mass  SKIN:  Inspection and Palpation of skin and subcutaneous tissue pale, fragile, thin, kaylie sleeves assist with protection   PSYCH:  insight and judgement, memory with impairment, affect and mood normal, follows commands readily     Lab/Diagnostic data:   Labs done while at Skilled Nursing Facility done by [facility] lab. Pertinent results are: reviewed    ASSESSMENT/PLAN  Dementia without behavioral disturbance, unspecified dementia type (H)  Depression with anxiety  Insomnia secondary to depression with anxiety  Weight loss  SLUMS 12/22/15 - 7/30  BIMS recently 6  PHQ9 recently 3    On mirtazapine 30 mg q HS for sleep, appetite stimulation, depression.      Resides in SNF for increased care needs  Able to make her needs known but unlikely she asks for much as she is quite content and demands quite little.    Noted weight loss of about 13 lbs in one year and 11 pounds in last 6 months.  9/3/19 - 119.5  3/3/20 - 117.8  9/8/20 - 106.2  Patient is on a regular diet with Juice-based supplements 6 oz BID, which recently were restarted.  Staff feed patient and now her son comes in 2x/week for assistance with feeding her.      PLAN:  - no GDR on  Mirtazapine as also being used for appetite stimulation and sleep, depression.  Limited visitors is adding to depression for many patients.  Her son is now able to come in for 2 days/week but previously visited daily.    - dietary for supplement titration as patient losing weight.   - nursing for increased care needs, appropriate for SNF placement.       Primary osteoarthritis involving multiple joints  Chronic left shoulder pain  Bilateral chronic knee pain  S/p left TKO with persistent pain  Analgesia with Tylenol 975 mg TID and 650 mg daily PRN, IcyHot to shoulder and neck TID  Has had steroid injections in the past to her shoulders which may have helped - last injection 12/5/19    Patient reports chronic pain in left shoulder but reports this as tolerable today.     PLAN:  - Tylenol 975 mg TID  - Tylenol 650 mg daily PRN  - IcyHot topically as ordered  - would be eligible for steroid injections if she wishes.      Slow transit constipation  On Miralax 17 gm po every other day  SNF EHR showing BM q 3-5 days - patient reports no constipation     PLAN:  - monitor closely  - Miralax QOD    Frailty  Impaired mobility and ADLs  Resides in SNF for increased care needs  Noted progressive dementia with Farheen lift for transfers, w/c for mobility  Staff anticipate her needs mostly.  Noted weight loss.     PLAN:  - appropriate for SNF placement d/t increased care needs and progressive frailty.         Electronically signed by:  LYNDSAY Cotton CNP              Sincerely,        LYNDSAY Cotton CNP

## 2020-11-24 NOTE — LETTER
11/24/2020        RE: Marilu Starr  [facility]  [address] [address]        [place] [facility]  PHYSICIAN NOTE    Chief Complaint   Patient presents with     intermediate Regulatory       HPI:    Marilu Starr is a 89 year old  (8/9/1928), who is being seen today for a federally mandated E/M visit at [facility] .  Marilu was admitted to LT in 2015. Does have frailty, OA, dementia, h/o CVA on Plavix & Parkinsonism. She is wheelchair bound and dependent in ADLs. She is seen in her room today sitting up in her wheelchair as she almost always is when I visit. She has the TV on and is eating some dry cheerios as a finger food. She is hard of hearing but able to participate in conversation with my slow, deepened voice. She is wearing her hearing aides today. She says she has no questions or complaints nor pain. I mention she has had continued slight weight loss and she denies abdominal complaints. She is vague on her sons' compassionate care visits to assist with feeding/companionship in this regard but I spoke with unit  who confirmed that both Marcus and Darwin do come in a few times per week on average. Its uncertain though if she eats much more with them than at other times. Her weight was mid-110s# at the beginning of 2020 and has slowly drifted down to 103-108# range into the summer/fall. Remeron was increased in May from 15 mg to 30 mg daily without effect. Goals of care are comfort based. Her RN feels she is quite stable, often quite quiet.    ALLERGIES: Codeine sulfate [codeine], Demerol [meperidine], and Morphine sulfate [morphine]    Past Medical History:   Diagnosis Date     CVD (cerebrovascular disease)      Dementia (H)      Insomnia      Parkinsonism (H)      Restless legs syndrome (RLS)       CODE STATUS: DNR/I    MEDICATIONS: Reviewed and updated in [facility] according to facility MAR  Current Outpatient Medications   Medication Sig Dispense Refill      "acetaminophen (TYLENOL) 325 MG tablet Take 3 tablets (975 mg) by mouth 3 times daily And 650 mg daily PRN       amoxicillin (AMOXIL) 500 MG capsule Take 4 capsules (2,000 mg) by mouth once as needed (prior to dental procedure, as directed by DENTAL staff)       clopidogrel (PLAVIX) 75 MG tablet Take 75 mg by mouth daily       erythromycin (ROMYCIN) 5 MG/GM ophthalmic ointment Place 0.25 inches into both eyes At Bedtime       Menthol-Methyl Salicylate (ICY HOT EXTRA STRENGTH) 10-30 % CREA Externally apply topically 3 times daily       mirtazapine (REMERON) 15 MG tablet Take 2 tablets (30 mg) by mouth At Bedtime       polyethylene glycol (MIRALAX/GLYCOLAX) packet Take 17 g by mouth every other day       Vitamin D3 (CHOLECALCIFEROL) 25 mcg (1000 units) tablet Take 1 tablet (25 mcg) by mouth daily         ROS:  Limited secondary to cognitive impairment but today pt reports as above in HPI    Exam:  /73   Pulse 97   Temp 97.6  F (36.4  C)   Resp 16   Ht 1.473 m (4' 10\")   Wt 46.8 kg (103 lb 3.2 oz)   SpO2 97%   BMI 21.57 kg/m    Alert, sitting in wheelchair casually dressed, nicely groomed  No conjunctivitis or blepharitis today  Forward flexed posture and masked facies, not drooling at the moment  [place] wearing hearing aides  Slow hypophonic speech, no tremor  Looks comfortable  No edema  Breathing non-labored, no cough  Vague historian    Lab/Diagnostic Data:    Serial negative COVID-19 screening tests    BMP within normal limits in September 5/7/20: Hgb 12.0, Vit D 34, Na 140, K 3.9, BUN 22, Cr 0.58    ASSESSMENT/PLAN:  Mrs. Marilu Starr is a sweet 89yoF with h/o frailty, OA, dementia, Parkinsonism and h/o CVA on Plavix seen for regulatory visit today. Overall stable LTC resident other than slight slowly progressive weight loss over the past year. Remeron increased without effect. Is on supplements. Seems comfortable and we are not pursing invasive work up given comfort based goals of care. Weight loss " could be r/t isolation of COVID-19 pandemic and/or general physical decline in the setting of dementia. Appreciate staff/family to allow compassionate care visits with her sons to aide in mealtime socialization and quality of life. Marilu offers no questions or concerns today and appears otherwise stable and staff agree. No changes to plan of care today. No comorbid condition/infection to pursue hospice discussion at this time but if that were to change, would consider hospice referral discussion with family in the setting of her weight loss.    Electronically signed by:  Rajwinder Harvey DO          Sincerely,        Rajwinder Harvey DO

## 2020-11-24 NOTE — PROGRESS NOTES
[place] [facility]  PHYSICIAN NOTE    Chief Complaint   Patient presents with     penitentiary Regulatory       HPI:    Marilu Starr is a 89 year old  (8/9/1928), who is being seen today for a federally mandated E/M visit at [facility] .  Marilu was admitted to LT in 2015. Does have frailty, OA, dementia, h/o CVA on Plavix & Parkinsonism. She is wheelchair bound and dependent in ADLs. She is seen in her room today sitting up in her wheelchair as she almost always is when I visit. She has the TV on and is eating some dry cheerios as a finger food. She is hard of hearing but able to participate in conversation with my slow, deepened voice. She is wearing her hearing aides today. She says she has no questions or complaints nor pain. I mention she has had continued slight weight loss and she denies abdominal complaints. She is vague on her sons' compassionate care visits to assist with feeding/companionship in this regard but I spoke with unit  who confirmed that both Marcus and Darwin do come in a few times per week on average. Its uncertain though if she eats much more with them than at other times. Her weight was mid-110s# at the beginning of 2020 and has slowly drifted down to 103-108# range into the summer/fall. Remeron was increased in May from 15 mg to 30 mg daily without effect. Goals of care are comfort based. Her RN feels she is quite stable, often quite quiet.    ALLERGIES: Codeine sulfate [codeine], Demerol [meperidine], and Morphine sulfate [morphine]    Past Medical History:   Diagnosis Date     CVD (cerebrovascular disease)      Dementia (H)      Insomnia      Parkinsonism (H)      Restless legs syndrome (RLS)       CODE STATUS: DNR/I    MEDICATIONS: Reviewed and updated in [facility] according to facility MAR  Current Outpatient Medications   Medication Sig Dispense Refill     acetaminophen (TYLENOL) 325 MG tablet Take 3 tablets (975 mg) by mouth 3 times daily And 650 mg daily PRN  "      amoxicillin (AMOXIL) 500 MG capsule Take 4 capsules (2,000 mg) by mouth once as needed (prior to dental procedure, as directed by DENTAL staff)       clopidogrel (PLAVIX) 75 MG tablet Take 75 mg by mouth daily       erythromycin (ROMYCIN) 5 MG/GM ophthalmic ointment Place 0.25 inches into both eyes At Bedtime       Menthol-Methyl Salicylate (ICY HOT EXTRA STRENGTH) 10-30 % CREA Externally apply topically 3 times daily       mirtazapine (REMERON) 15 MG tablet Take 2 tablets (30 mg) by mouth At Bedtime       polyethylene glycol (MIRALAX/GLYCOLAX) packet Take 17 g by mouth every other day       Vitamin D3 (CHOLECALCIFEROL) 25 mcg (1000 units) tablet Take 1 tablet (25 mcg) by mouth daily         ROS:  Limited secondary to cognitive impairment but today pt reports as above in HPI    Exam:  /73   Pulse 97   Temp 97.6  F (36.4  C)   Resp 16   Ht 1.473 m (4' 10\")   Wt 46.8 kg (103 lb 3.2 oz)   SpO2 97%   BMI 21.57 kg/m    Alert, sitting in wheelchair casually dressed, nicely groomed  No conjunctivitis or blepharitis today  Forward flexed posture and masked facies, not drooling at the moment  [place] wearing hearing aides  Slow hypophonic speech, no tremor  Looks comfortable  No edema  Breathing non-labored, no cough  Vague historian    Lab/Diagnostic Data:    Serial negative COVID-19 screening tests    BMP within normal limits in September 5/7/20: Hgb 12.0, Vit D 34, Na 140, K 3.9, BUN 22, Cr 0.58    ASSESSMENT/PLAN:  Mrs. Marilu Starr is a sweet 89yoF with h/o frailty, OA, dementia, Parkinsonism and h/o CVA on Plavix seen for regulatory visit today. Overall stable LTC resident other than slight slowly progressive weight loss over the past year. Remeron increased without effect. Is on supplements. Seems comfortable and we are not pursing invasive work up given comfort based goals of care. Weight loss could be r/t isolation of COVID-19 pandemic and/or general physical decline in the setting of dementia. " Appreciate staff/family to allow compassionate care visits with her sons to aide in mealtime socialization and quality of life. Marilu offers no questions or concerns today and appears otherwise stable and staff agree. No changes to plan of care today. No comorbid condition/infection to pursue hospice discussion at this time but if that were to change, would consider hospice referral discussion with family in the setting of her weight loss.    Electronically signed by:  Rajwinder Harvey DO

## 2020-12-31 NOTE — PROGRESS NOTES
[place] [facility]  Chief Complaint   Patient presents with     assisted Regulatory     [place] Medical Record Number:  [id number]  Place of Service where encounter took place:  [facility] ([facility]) [[id number]]    HPI:    Marilu Starr  is 89 year old (8/9/1928), who is being seen today for a federally mandated E/M visit.  HPI information obtained from: facility chart records, facility staff, patient report, [facility] chart review and family/first contact pt's son's report. Today's concerns are:     Dementia without behavioral disturbance, unspecified dementia type (H)  Depression with anxiety  Insomnia secondary to depression with anxiety  Parkinsonism, unspecified Parkinsonism type (H)  Primary osteoarthritis involving multiple joints  Chronic left shoulder pain  Bilateral chronic knee pain  Frailty  Impaired mobility and ADLs  Weight loss     Patient is a resident of [facility] since 2015.  She has PMH including dementia, parkinsonism features, OA, History CVA on Plavix, vitamin D deficiency depression, GERD, HLD, osteoporosis, frailty with impaired mobility and ADLs which require SNF residence for increased care needs.       Recent Medication Changes:  - 5/5/20: Mirtazapine increased to 30 mg daily for weight loss, depression, etc.   - 8/26/20: Juice-based supplement 6 oz BID started for weight loss    Patient is met today in her SNF room where she is on a video visit with her son.  She denies any pain, shortness of breath, CP, HA< lightheadedness, upset stomach, heartburn, constipation.  The three of us talk about her condition and her eating, weight loss and ways to help remedy this.  Her son said he would bring something she likes the next time he or his other brother visits.  He noted she needs a lot of assistance with eating and is happy with his mom's care.    Patient reports she has never been a very good sleeper but does not feel overtly fatigued.      ALLERGIES:Codeine  sulfate [codeine], Demerol [meperidine], and Morphine sulfate [morphine]  PAST MEDICAL HISTORY:   has a past medical history of CVD (cerebrovascular disease), Dementia (H), Insomnia, Parkinsonism (H), and Restless legs syndrome (RLS).  PAST SURGICAL HISTORY:   has a past surgical history that includes Right knee arthroplasty and Left shoulder arthroscopy (Left).  FAMILY HISTORY: family history is not on file.  SOCIAL HISTORY:  reports that she has never smoked. She has never used smokeless tobacco. She reports previous alcohol use.    MEDICATIONS:  Current Outpatient Medications   Medication Sig Dispense Refill     acetaminophen (TYLENOL) 325 MG tablet Take 3 tablets (975 mg) by mouth 3 times daily And 650 mg daily PRN       amoxicillin (AMOXIL) 500 MG capsule Take 4 capsules (2,000 mg) by mouth once as needed (prior to dental procedure, as directed by DENTAL staff)       clopidogrel (PLAVIX) 75 MG tablet Take 75 mg by mouth daily       erythromycin (ROMYCIN) 5 MG/GM ophthalmic ointment Place 0.25 inches into both eyes At Bedtime       Menthol-Methyl Salicylate (ICY HOT EXTRA STRENGTH) 10-30 % CREA Externally apply topically 3 times daily       mirtazapine (REMERON) 15 MG tablet Take 2 tablets (30 mg) by mouth At Bedtime       polyethylene glycol (MIRALAX/GLYCOLAX) packet Take 17 g by mouth every other day       Vitamin D3 (CHOLECALCIFEROL) 25 mcg (1000 units) tablet Take 1 tablet (25 mcg) by mouth daily         Case Management:  I have reviewed the care plan and MDS and do agree with the plan. Patient's desire to return to the community is not present. Information reviewed:  Medications, vital signs, orders, and nursing notes.    ROS:  Limited secondary to cognitive impairment but today pt reports 10 point ROS of systems including Constitutional, Eyes, Respiratory, Cardiovascular, Gastroenterology, Genitourinary, Integumentary, Musculoskeletal, Psychiatric were all negative except for pertinent positives noted in  "my HPI.    Vitals:  /73   Pulse 97   Temp 97.3  F (36.3  C)   Resp 16   Ht 1.473 m (4' 10\")   Wt 45.7 kg (100 lb 12.8 oz)   SpO2 97%   BMI 21.07 kg/m    Body mass index is 21.07 kg/m .  Exam:  GENERAL APPEARANCE:  Alert, in no distress, very frail, elderly woman. Pleasant  RESP:  respiratory effort and palpation of chest normal, auscultation of lungs clear, no respiratory distress  CV:  Palpation and auscultation of heart done , rate and rhythm regular, no murmur, no LE peripheral edema  ABDOMEN:  normal bowel sounds, soft, nontender, no hepatosplenomegaly or other masses  M/S:   Gait and station with w/c for mobility, Digits and nails with significant arthritic changes, significantly reduced muscle mass  SKIN:  Inspection and Palpation of skin and subcutaneous tissue pale, poor turgor, thin, fragile,   PSYCH:  insight and judgement, memory with impairment, affect and mood normal, follows commands with repeated request.       Lab/Diagnostic data:   Labs done while at [facility] done by [email or web address] lab. Pertinent results are: reviewed    ASSESSMENT/PLAN  Dementia without behavioral disturbance, unspecified dementia type (H)  Depression with anxiety  Insomnia secondary to depression with anxiety  Weight Loss  SLUMS 12/22/15 - 7/30  BIMS recently 3  PHQ9 recently 7     On mirtazapine 30 mg q HS for sleep, appetite stimulation, depression.      Resides in SNF for increased care needs  Able to make her needs known but unlikely she asks for much as she is quite content and demands quite little.     Noted weight loss of about 19 lbs in the last 4-5 months   9/3/19 - 119.5  3/3/20 - 117.8  9/8/20 - 106.2  10/6/20: 103.2  11/3/20: 104.2  12/5/20: 100.8  Patient is on a regular diet with Juice-based supplements 6 oz BID  Staff feed patient and now her son comes in 2x/week for assistance with feeding her.      PLAN:  - increase supplements to TID  - nursing staff to assist with eating meals.  - " family visits when they can (based on restrictions) and brings her things she likes to eat along with their company.   - no GDR of mirtazapine as PHq9 higher and weight loss noted.     Parkinsonism, unspecified Parkinsonism type (H)  Taken off Sinemet by Neurologist  Moved to LTC SNF 2015  Uses W/C for mobility  Noted cognitive decline  Is on a regular diet, no drooling noted during today's visit  Has a flat affect  Uses w/c for mobility, has slow motor movements.      PLAN:  - Comfort goal  - nursing for increased care needs  - staff to assist with eating.     Primary osteoarthritis involving multiple joints  Chronic left shoulder pain  Bilateral chronic knee pain  S/p left TKO with persistent pain  Analgesia with Tylenol 975 mg TID and 650 mg daily PRN, IcyHot to shoulder and neck TID  Has had steroid injections in the past to her shoulders which may have helped - last injection 12/5/19    Patient today reports minimal pain in her shoulders.     PLAN:  - continue Tylenol 975 TID and daily PRN  - continue IcyHot TID  - nursing for positioning in her w/c    Frailty  Impaired mobility and ADLs  Resides in SNF for increased care needs  Noted progressive dementia with Farheen lift for transfers, w/c for mobility  Staff anticipate her needs mostly.  Noted weight loss.      PLAN:  - appropriate for SNF placement d/t increased care needs and progressive frailty.       Orders written by provider at facility  1. Increase juice-based supplement to TID.         Electronically signed by:  LYNDSAY Cotton CNP

## 2021-01-01 ENCOUNTER — NURSING HOME VISIT (OUTPATIENT)
Dept: GERIATRICS | Facility: CLINIC | Age: 86
End: 2021-01-01
Payer: MEDICARE

## 2021-01-01 ENCOUNTER — TELEPHONE (OUTPATIENT)
Dept: GERIATRICS | Facility: CLINIC | Age: 86
End: 2021-01-01

## 2021-01-01 ENCOUNTER — NURSING HOME VISIT (OUTPATIENT)
Dept: GERIATRICS | Facility: CLINIC | Age: 86
End: 2021-01-01
Payer: COMMERCIAL

## 2021-01-01 VITALS
OXYGEN SATURATION: 90 % | RESPIRATION RATE: 18 BRPM | WEIGHT: 97 LBS | TEMPERATURE: 98 F | HEART RATE: 69 BPM | SYSTOLIC BLOOD PRESSURE: 124 MMHG | HEIGHT: 58 IN | DIASTOLIC BLOOD PRESSURE: 69 MMHG | BODY MASS INDEX: 20.36 KG/M2

## 2021-01-01 VITALS
HEART RATE: 97 BPM | BODY MASS INDEX: 21.16 KG/M2 | DIASTOLIC BLOOD PRESSURE: 73 MMHG | RESPIRATION RATE: 16 BRPM | HEIGHT: 58 IN | OXYGEN SATURATION: 97 % | TEMPERATURE: 97.3 F | WEIGHT: 100.8 LBS | SYSTOLIC BLOOD PRESSURE: 132 MMHG

## 2021-01-01 DIAGNOSIS — F51.05 INSOMNIA SECONDARY TO DEPRESSION WITH ANXIETY: ICD-10-CM

## 2021-01-01 DIAGNOSIS — M25.512 CHRONIC LEFT SHOULDER PAIN: ICD-10-CM

## 2021-01-01 DIAGNOSIS — G20.C PARKINSONISM, UNSPECIFIED PARKINSONISM TYPE (H): ICD-10-CM

## 2021-01-01 DIAGNOSIS — R63.4 WEIGHT LOSS: ICD-10-CM

## 2021-01-01 DIAGNOSIS — R54 FRAILTY: ICD-10-CM

## 2021-01-01 DIAGNOSIS — Z74.09 IMPAIRED MOBILITY AND ADLS: ICD-10-CM

## 2021-01-01 DIAGNOSIS — M15.0 PRIMARY OSTEOARTHRITIS INVOLVING MULTIPLE JOINTS: ICD-10-CM

## 2021-01-01 DIAGNOSIS — Z78.9 IMPAIRED MOBILITY AND ADLS: ICD-10-CM

## 2021-01-01 DIAGNOSIS — Z51.5 HOSPICE CARE PATIENT: Primary | ICD-10-CM

## 2021-01-01 DIAGNOSIS — L89.159 PRESSURE ULCER OF COCCYGEAL REGION, UNSPECIFIED PRESSURE ULCER STAGE: ICD-10-CM

## 2021-01-01 DIAGNOSIS — G89.29 BILATERAL CHRONIC KNEE PAIN: ICD-10-CM

## 2021-01-01 DIAGNOSIS — M25.561 BILATERAL CHRONIC KNEE PAIN: ICD-10-CM

## 2021-01-01 DIAGNOSIS — F41.8 DEPRESSION WITH ANXIETY: ICD-10-CM

## 2021-01-01 DIAGNOSIS — M25.562 BILATERAL CHRONIC KNEE PAIN: ICD-10-CM

## 2021-01-01 DIAGNOSIS — F03.90 DEMENTIA WITHOUT BEHAVIORAL DISTURBANCE, UNSPECIFIED DEMENTIA TYPE: ICD-10-CM

## 2021-01-01 DIAGNOSIS — F41.8 INSOMNIA SECONDARY TO DEPRESSION WITH ANXIETY: ICD-10-CM

## 2021-01-01 DIAGNOSIS — F03.90 DEMENTIA WITHOUT BEHAVIORAL DISTURBANCE, UNSPECIFIED DEMENTIA TYPE: Primary | ICD-10-CM

## 2021-01-01 DIAGNOSIS — G89.29 CHRONIC LEFT SHOULDER PAIN: ICD-10-CM

## 2021-01-01 PROCEDURE — 99309 SBSQ NF CARE MODERATE MDM 30: CPT | Performed by: NURSE PRACTITIONER

## 2021-01-01 PROCEDURE — 99309 SBSQ NF CARE MODERATE MDM 30: CPT | Mod: GV | Performed by: INTERNAL MEDICINE

## 2021-01-01 RX ORDER — SENNOSIDES A AND B 8.6 MG/1
1 TABLET, FILM COATED ORAL 2 TIMES DAILY PRN
COMMUNITY

## 2021-01-01 RX ORDER — CHLORHEXIDINE GLUCONATE ORAL RINSE 1.2 MG/ML
15 SOLUTION DENTAL 2 TIMES DAILY
COMMUNITY

## 2021-01-01 RX ORDER — HYDROMORPHONE HYDROCHLORIDE 1 MG/ML
SOLUTION ORAL
COMMUNITY

## 2021-01-01 RX ORDER — ACETAMINOPHEN 650 MG/1
650 SUPPOSITORY RECTAL EVERY 4 HOURS PRN
COMMUNITY

## 2021-01-01 RX ORDER — BISACODYL 10 MG
10 SUPPOSITORY, RECTAL RECTAL DAILY PRN
COMMUNITY

## 2021-01-01 ASSESSMENT — MIFFLIN-ST. JEOR
SCORE: 739.74
SCORE: 756.98

## 2021-01-04 NOTE — LETTER
1/4/2021        RE: Marilu Starr  [facility]  [address] [address]        [place] [facility]  Chief Complaint   Patient presents with     senior living Regulatory     [place] Medical Record Number:  [id number]  Place of Service where encounter took place:  [facility] ([facility]) [[id number]]    HPI:    Marilu Starr  is 89 year old (8/9/1928), who is being seen today for a federally mandated E/M visit.  HPI information obtained from: facility chart records, facility staff, patient report, [facility] chart review and family/first contact pt's son's report. Today's concerns are:     Dementia without behavioral disturbance, unspecified dementia type (H)  Depression with anxiety  Insomnia secondary to depression with anxiety  Parkinsonism, unspecified Parkinsonism type (H)  Primary osteoarthritis involving multiple joints  Chronic left shoulder pain  Bilateral chronic knee pain  Frailty  Impaired mobility and ADLs  Weight loss     Patient is a resident of [facility] since 2015.  She has PMH including dementia, parkinsonism features, OA, History CVA on Plavix, vitamin D deficiency depression, GERD, HLD, osteoporosis, frailty with impaired mobility and ADLs which require SNF residence for increased care needs.       Recent Medication Changes:  - 5/5/20: Mirtazapine increased to 30 mg daily for weight loss, depression, etc.   - 8/26/20: Juice-based supplement 6 oz BID started for weight loss    Patient is met today in her SNF room where she is on a video visit with her son.  She denies any pain, shortness of breath, CP, HA< lightheadedness, upset stomach, heartburn, constipation.  The three of us talk about her condition and her eating, weight loss and ways to help remedy this.  Her son said he would bring something she likes the next time he or his other brother visits.  He noted she needs a lot of assistance with eating and is happy with his mom's care.    Patient reports  she has never been a very good sleeper but does not feel overtly fatigued.      ALLERGIES:Codeine sulfate [codeine], Demerol [meperidine], and Morphine sulfate [morphine]  PAST MEDICAL HISTORY:   has a past medical history of CVD (cerebrovascular disease), Dementia (H), Insomnia, Parkinsonism (H), and Restless legs syndrome (RLS).  PAST SURGICAL HISTORY:   has a past surgical history that includes Right knee arthroplasty and Left shoulder arthroscopy (Left).  FAMILY HISTORY: family history is not on file.  SOCIAL HISTORY:  reports that she has never smoked. She has never used smokeless tobacco. She reports previous alcohol use.    MEDICATIONS:  Current Outpatient Medications   Medication Sig Dispense Refill     acetaminophen (TYLENOL) 325 MG tablet Take 3 tablets (975 mg) by mouth 3 times daily And 650 mg daily PRN       amoxicillin (AMOXIL) 500 MG capsule Take 4 capsules (2,000 mg) by mouth once as needed (prior to dental procedure, as directed by DENTAL staff)       clopidogrel (PLAVIX) 75 MG tablet Take 75 mg by mouth daily       erythromycin (ROMYCIN) 5 MG/GM ophthalmic ointment Place 0.25 inches into both eyes At Bedtime       Menthol-Methyl Salicylate (ICY HOT EXTRA STRENGTH) 10-30 % CREA Externally apply topically 3 times daily       mirtazapine (REMERON) 15 MG tablet Take 2 tablets (30 mg) by mouth At Bedtime       polyethylene glycol (MIRALAX/GLYCOLAX) packet Take 17 g by mouth every other day       Vitamin D3 (CHOLECALCIFEROL) 25 mcg (1000 units) tablet Take 1 tablet (25 mcg) by mouth daily         Case Management:  I have reviewed the care plan and MDS and do agree with the plan. Patient's desire to return to the community is not present. Information reviewed:  Medications, vital signs, orders, and nursing notes.    ROS:  Limited secondary to cognitive impairment but today pt reports 10 point ROS of systems including Constitutional, Eyes, Respiratory, Cardiovascular, Gastroenterology, Genitourinary,  "Integumentary, Musculoskeletal, Psychiatric were all negative except for pertinent positives noted in my HPI.    Vitals:  /73   Pulse 97   Temp 97.3  F (36.3  C)   Resp 16   Ht 1.473 m (4' 10\")   Wt 45.7 kg (100 lb 12.8 oz)   SpO2 97%   BMI 21.07 kg/m    Body mass index is 21.07 kg/m .  Exam:  GENERAL APPEARANCE:  Alert, in no distress, very frail, elderly woman. Pleasant  RESP:  respiratory effort and palpation of chest normal, auscultation of lungs clear, no respiratory distress  CV:  Palpation and auscultation of heart done , rate and rhythm regular, no murmur, no LE peripheral edema  ABDOMEN:  normal bowel sounds, soft, nontender, no hepatosplenomegaly or other masses  M/S:   Gait and station with w/c for mobility, Digits and nails with significant arthritic changes, significantly reduced muscle mass  SKIN:  Inspection and Palpation of skin and subcutaneous tissue pale, poor turgor, thin, fragile,   PSYCH:  insight and judgement, memory with impairment, affect and mood normal, follows commands with repeated request.       Lab/Diagnostic data:   [facility] done by [facility] lab. Pertinent results are: reviewed    ASSESSMENT/PLAN  Dementia without behavioral disturbance, unspecified dementia type (H)  Depression with anxiety  Insomnia secondary to depression with anxiety  Weight Loss  SLUMS 12/22/15 - 7/30  BIMS recently 3  PHQ9 recently 7     On mirtazapine 30 mg q HS for sleep, appetite stimulation, depression.      Resides in SNF for increased care needs  Able to make her needs known but unlikely she asks for much as she is quite content and demands quite little.     Noted weight loss of about 19 lbs in the last 4-5 months   9/3/19 - 119.5  3/3/20 - 117.8  9/8/20 - 106.2  10/6/20: 103.2  11/3/20: 104.2  12/5/20: 100.8  Patient is on a regular diet with Juice-based supplements 6 oz BID  Staff feed patient and now her son comes in 2x/week for assistance with " feeding her.      PLAN:  - increase supplements to TID  - nursing staff to assist with eating meals.  - family visits when they can (based on restrictions) and brings her things she likes to eat along with their company.   - no GDR of mirtazapine as PHq9 higher and weight loss noted.     Parkinsonism, unspecified Parkinsonism type (H)  Taken off Sinemet by Neurologist  Moved to LT SNF 2015  Uses W/C for mobility  Noted cognitive decline  Is on a regular diet, no drooling noted during today's visit  Has a flat affect  Uses w/c for mobility, has slow motor movements.      PLAN:  - Comfort goal  - nursing for increased care needs  - staff to assist with eating.     Primary osteoarthritis involving multiple joints  Chronic left shoulder pain  Bilateral chronic knee pain  S/p left TKO with persistent pain  Analgesia with Tylenol 975 mg TID and 650 mg daily PRN, IcyHot to shoulder and neck TID  Has had steroid injections in the past to her shoulders which may have helped - last injection 12/5/19    Patient today reports minimal pain in her shoulders.     PLAN:  - continue Tylenol 975 TID and daily PRN  - continue IcyHot TID  - nursing for positioning in her w/c    Frailty  Impaired mobility and ADLs  Resides in SNF for increased care needs  Noted progressive dementia with Farheen lift for transfers, w/c for mobility  Staff anticipate her needs mostly.  Noted weight loss.      PLAN:  - appropriate for SNF placement d/t increased care needs and progressive frailty.       Orders written by provider at facility  1. Increase juice-based supplement to TID.         Electronically signed by:  LYNDSAY Cotton CNP                Sincerely,        LYNDSAY Cotton CNP

## 2021-02-17 NOTE — TELEPHONE ENCOUNTER
Enrolled with [facility] today with admitting diagnosis of Parkinson's Disease.  Comfort pack medications approved with PRN hydromorphone d/t morphine allergy (unknown reaction).

## 2021-03-13 NOTE — LETTER
3/13/2021        RE: Marilu Starr  [facility]  [address] [address]        [place] [facility]  PHYSICIAN NOTE    Chief Complaint   Patient presents with     prison Regulatory       HPI:    Marilu Starr is a 89 year old  (8/9/1928), who is being seen today for a federally mandated E/M visit at [facility] . Admitted to LTC in in 2015. Does have frailty, OA, dementia, h/o CVA on Plavix & Parkinsonism. She has been wheelchair bound and dependent in ADLs for quite some time.    Has had further decline overall the past couple months with advancing chronic disease. Enrolled with [facility] last month.    Marilu is actively declining on [facility] care with advanced dementia with parkinsonism and frailty with now bedbound state. Limited po intake. Sons visiting for compassionate care visits. Coccyx pressure ulcer being dressed daily and offloading but still is deepening some. Hospice has been increasing Dilaudid d/t signs/sx of pain including facial grimacing. Indeed, she admits to some pain during my visit as well as staff are finishing AM cares and dressing change; more comfortable at rest. Its hard for her to speak d/t her advanced dementia but she does make eye contact. BMs are charted per staff every few days. Weight down from 110s a year ago to 104# in Jan 2021 and now recently 97# Feb 2021.    ALLERGIES: Codeine sulfate [codeine], Demerol [meperidine], and Morphine sulfate [morphine]    Past Medical History:   Diagnosis Date     CVD (cerebrovascular disease)      Dementia (H)      Insomnia      Parkinsonism (H)      Restless legs syndrome (RLS)       CODE STATUS: DNR/I Hospice    MEDICATIONS: Reviewed and updated in [facility] according to facility MAR  Current Outpatient Medications   Medication Sig Dispense Refill     acetaminophen (TYLENOL) 650 MG suppository Place 650 mg rectally every 4 hours as needed for fever       amoxicillin (AMOXIL) 500 MG  "capsule Take 4 capsules (2,000 mg) by mouth once as needed (prior to dental procedure, as directed by DENTAL staff)       atropine 1 % SOLN Place 2 drops under the tongue every 4 hours as needed for secretions       bisacodyl (DULCOLAX) 10 MG suppository Place 10 mg rectally daily as needed for constipation       chlorhexidine (CHLORHEXIDINE) 0.12 % solution 15 mLs 2 times daily Via oral swab per nursing staff       clopidogrel (PLAVIX) 75 MG tablet Take 75 mg by mouth daily       erythromycin (ROMYCIN) 5 MG/GM ophthalmic ointment Place 0.25 inches into both eyes At Bedtime       HYDROmorphone, HIGH CONC, (DILAUDID) 10 mg/mL LIQD oral Give 1 mg scheduled at 6 AM, 8 AM, 2PM, and 10 PM as well as an additional 1 mg every 2 hours PRN pain       Menthol-Methyl Salicylate (ICY HOT EXTRA STRENGTH) 10-30 % CREA Externally apply topically 3 times daily       mirtazapine (REMERON) 15 MG tablet Take 2 tablets (30 mg) by mouth At Bedtime       polyethylene glycol (MIRALAX/GLYCOLAX) packet Take 17 g by mouth every other day       senna (SENOKOT) 8.6 MG tablet Take 1 tablet by mouth 2 times daily as needed for constipation         ROS:  Unobtainable secondary to cognitive impairment. Does admit to pain with position changes.    Exam:  /69   Pulse 69   Temp 98  F (36.7  C)   Resp 18   Ht 1.473 m (4' 10\")   Wt 44 kg (97 lb)   SpO2 90%   BMI 20.27 kg/m    Lying in bed casually dressed, very frail/thin, well groomed  Slightly dry oral mucosa with some residue noted; no signs of thrush  Breathing non-labored, no cough  Stiffness of extremities noted with masked facies and kyphosis of spine  Dressing over right elbow  I did not look at coccyx ulcer as it was just dressed by staff and position changes are painful for Marilu  Some furrowing of brow with position changes    Lab/Diagnostic Data:    No recent labs given comfort goal other than COVID-19 facility screening tests    ASSESSMENT/PLAN:  Hospice care patient  Marilu " was enrolled with [facility] in Feb 2021  I spoke with her hospice nurse Charity who recently increased Marilu' Dilaudid and scheduled it for pain control  Has doses at 6 AM, 8 AM(often when doing wound cares), 2 PM and 10 PM with additional PRNs available  Limited oral intake, losing weight as expected with advancing chronic disease  Marilu looked more comfortable after the necessary repositioning and wound care    Dementia without behavioral disturbance, unspecified dementia type (H)  Parkinsonism, unspecified Parkinsonism type (H)  Known underlying hospice qualifying conditions    Weight loss  Frailty  Weight down from 110s a year ago to 104# in Jan 2021 and now recently 97# Feb 2021  Noted progressive weight loss despite additional supplements and Remeron  Continues on Remeron now as is chronic medication (?helping any psychiatric symptoms)    Pressure ulcer of coccygeal region, unspecified pressure ulcer stage  Unfortunately worsening despite best care, now completely bedbound so not having seated pressure on it  Treat pain as noted above given comfort goals of care  Would expect further worsening given progressive disease with poor po intake; again treat pain  Appreciate nursing staff diligent cares    Oral hygiene  Due to poor oral intake and inability for her to brush her teeth, some natural residue has built up  I ordered Peridex mouth wash today for staff to use BID with oral swab for improving oral cares for comfort      Electronically signed by:  Rajwinder Harvey DO        Sincerely,        Rajwinder Harvey DO

## 2021-03-13 NOTE — Clinical Note
Hi, spoke with hospice nurse who had adjusted Marilu' scheduled Dilaudid last week and I suspect Marilu may pass in the coming days-week. Family doing a lot of visits which is good.

## 2021-03-13 NOTE — PROGRESS NOTES
[place] [facility]  PHYSICIAN NOTE    Chief Complaint   Patient presents with     [employer] Regulatory       HPI:    Marilu Starr is a 89 year old  (8/9/1928), who is being seen today for a federally mandated E/M visit at [facility] . Admitted to LT in in 2015. Does have frailty, OA, dementia, h/o CVA on Plavix & Parkinsonism. She has been wheelchair bound and dependent in ADLs for quite some time.    Has had further decline overall the past couple months with advancing chronic disease. Enrolled with [facility] last month.    Marilu is actively declining on [facility] care with advanced dementia with parkinsonism and frailty with now bedbound state. Limited po intake. Sons visiting for compassionate care visits. Coccyx pressure ulcer being dressed daily and offloading but still is deepening some. Hospice has been increasing Dilaudid d/t signs/sx of pain including facial grimacing. Indeed, she admits to some pain during my visit as well as staff are finishing AM cares and dressing change; more comfortable at rest. Its hard for her to speak d/t her advanced dementia but she does make eye contact. BMs are charted per staff every few days. Weight down from 110s a year ago to 104# in Jan 2021 and now recently 97# Feb 2021.    ALLERGIES: Codeine sulfate [codeine], Demerol [meperidine], and Morphine sulfate [morphine]    Past Medical History:   Diagnosis Date     CVD (cerebrovascular disease)      Dementia (H)      Insomnia      Parkinsonism (H)      Restless legs syndrome (RLS)       CODE STATUS: DNR/I Hospice    MEDICATIONS: Reviewed and updated in [facility] according to facility MAR  Current Outpatient Medications   Medication Sig Dispense Refill     acetaminophen (TYLENOL) 650 MG suppository Place 650 mg rectally every 4 hours as needed for fever       amoxicillin (AMOXIL) 500 MG capsule Take 4 capsules (2,000 mg) by mouth once as needed (prior to dental procedure, as directed by DENTAL  "staff)       atropine 1 % SOLN Place 2 drops under the tongue every 4 hours as needed for secretions       bisacodyl (DULCOLAX) 10 MG suppository Place 10 mg rectally daily as needed for constipation       chlorhexidine (CHLORHEXIDINE) 0.12 % solution 15 mLs 2 times daily Via oral swab per nursing staff       clopidogrel (PLAVIX) 75 MG tablet Take 75 mg by mouth daily       erythromycin (ROMYCIN) 5 MG/GM ophthalmic ointment Place 0.25 inches into both eyes At Bedtime       HYDROmorphone, HIGH CONC, (DILAUDID) 10 mg/mL LIQD oral Give 1 mg scheduled at 6 AM, 8 AM, 2PM, and 10 PM as well as an additional 1 mg every 2 hours PRN pain       Menthol-Methyl Salicylate (ICY HOT EXTRA STRENGTH) 10-30 % CREA Externally apply topically 3 times daily       mirtazapine (REMERON) 15 MG tablet Take 2 tablets (30 mg) by mouth At Bedtime       polyethylene glycol (MIRALAX/GLYCOLAX) packet Take 17 g by mouth every other day       senna (SENOKOT) 8.6 MG tablet Take 1 tablet by mouth 2 times daily as needed for constipation         ROS:  Unobtainable secondary to cognitive impairment. Does admit to pain with position changes.    Exam:  /69   Pulse 69   Temp 98  F (36.7  C)   Resp 18   Ht 1.473 m (4' 10\")   Wt 44 kg (97 lb)   SpO2 90%   BMI 20.27 kg/m    Lying in bed casually dressed, very frail/thin, well groomed  Slightly dry oral mucosa with some residue noted; no signs of thrush  Breathing non-labored, no cough  Stiffness of extremities noted with masked facies and kyphosis of spine  Dressing over right elbow  I did not look at coccyx ulcer as it was just dressed by staff and position changes are painful for Marilu  Some furrowing of brow with position changes    Lab/Diagnostic Data:    No recent labs given comfort goal other than COVID-19 facility screening tests    ASSESSMENT/PLAN:  Hospice care patient  Marilu was enrolled with [facility] in Feb 2021  I spoke with her hospice nurse Charity who recently increased " Marilu' Dilaudid and scheduled it for pain control  Has doses at 6 AM, 8 AM(often when doing wound cares), 2 PM and 10 PM with additional PRNs available  Limited oral intake, losing weight as expected with advancing chronic disease  Marilu looked more comfortable after the necessary repositioning and wound care    Dementia without behavioral disturbance, unspecified dementia type (H)  Parkinsonism, unspecified Parkinsonism type (H)  Known underlying hospice qualifying conditions    Weight loss  Frailty  Weight down from 110s a year ago to 104# in Jan 2021 and now recently 97# Feb 2021  Noted progressive weight loss despite additional supplements and Remeron  Continues on Remeron now as is chronic medication (?helping any psychiatric symptoms)    Pressure ulcer of coccygeal region, unspecified pressure ulcer stage  Unfortunately worsening despite best care, now completely bedbound so not having seated pressure on it  Treat pain as noted above given comfort goals of care  Would expect further worsening given progressive disease with poor po intake; again treat pain  Appreciate nursing staff diligent cares    Oral hygiene  Due to poor oral intake and inability for her to brush her teeth, some natural residue has built up  I ordered Peridex mouth wash today for staff to use BID with oral swab for improving oral cares for comfort      Electronically signed by:  Rajwinder Harvey DO